# Patient Record
Sex: FEMALE | Race: WHITE
[De-identification: names, ages, dates, MRNs, and addresses within clinical notes are randomized per-mention and may not be internally consistent; named-entity substitution may affect disease eponyms.]

---

## 2017-07-08 ENCOUNTER — HOSPITAL ENCOUNTER (OUTPATIENT)
Dept: HOSPITAL 47 - LABPAT | Age: 47
Discharge: HOME | End: 2017-07-08
Payer: COMMERCIAL

## 2017-07-08 DIAGNOSIS — Z01.812: Primary | ICD-10-CM

## 2017-07-08 LAB
ANION GAP SERPL CALC-SCNC: 13 MMOL/L
BASOPHILS # BLD AUTO: 0 K/UL (ref 0–0.2)
BASOPHILS NFR BLD AUTO: 0 %
BUN SERPL-SCNC: 16 MG/DL (ref 7–17)
CALCIUM SPEC-MCNC: 9 MG/DL (ref 8.4–10.2)
CH: 32.7
CHCM: 34
CHLORIDE SERPL-SCNC: 103 MMOL/L (ref 98–107)
CO2 SERPL-SCNC: 24 MMOL/L (ref 22–30)
EOSINOPHIL # BLD AUTO: 0.3 K/UL (ref 0–0.7)
EOSINOPHIL NFR BLD AUTO: 3 %
ERYTHROCYTE [DISTWIDTH] IN BLOOD BY AUTOMATED COUNT: 4.3 M/UL (ref 3.8–5.4)
ERYTHROCYTE [DISTWIDTH] IN BLOOD: 13.7 % (ref 11.5–15.5)
GLUCOSE SERPL-MCNC: 169 MG/DL (ref 74–99)
HCT VFR BLD AUTO: 41.5 % (ref 34–46)
HDW: 2.24
HGB BLD-MCNC: 14.3 GM/DL (ref 11.4–16)
LUC NFR BLD AUTO: 1 %
LYMPHOCYTES # SPEC AUTO: 2.1 K/UL (ref 1–4.8)
LYMPHOCYTES NFR SPEC AUTO: 26 %
MCH RBC QN AUTO: 33.2 PG (ref 25–35)
MCHC RBC AUTO-ENTMCNC: 34.4 G/DL (ref 31–37)
MCV RBC AUTO: 96.6 FL (ref 80–100)
MONOCYTES # BLD AUTO: 0.3 K/UL (ref 0–1)
MONOCYTES NFR BLD AUTO: 4 %
NEUTROPHILS # BLD AUTO: 5.3 K/UL (ref 1.3–7.7)
NEUTROPHILS NFR BLD AUTO: 66 %
NON-AFRICAN AMERICAN GFR(MDRD): >60
POTASSIUM SERPL-SCNC: 4.1 MMOL/L (ref 3.5–5.1)
SODIUM SERPL-SCNC: 140 MMOL/L (ref 137–145)
WBC # BLD AUTO: 0.12 10*3/UL
WBC # BLD AUTO: 8 K/UL (ref 3.8–10.6)
WBC (PEROX): 8

## 2017-07-08 PROCEDURE — 85025 COMPLETE CBC W/AUTO DIFF WBC: CPT

## 2017-07-08 PROCEDURE — 80048 BASIC METABOLIC PNL TOTAL CA: CPT

## 2017-07-17 ENCOUNTER — HOSPITAL ENCOUNTER (OUTPATIENT)
Dept: HOSPITAL 47 - OR | Age: 47
LOS: 1 days | Discharge: HOME | End: 2017-07-18
Payer: COMMERCIAL

## 2017-07-17 VITALS — BODY MASS INDEX: 47.5 KG/M2

## 2017-07-17 DIAGNOSIS — M54.2: ICD-10-CM

## 2017-07-17 DIAGNOSIS — N85.00: ICD-10-CM

## 2017-07-17 DIAGNOSIS — Z79.899: ICD-10-CM

## 2017-07-17 DIAGNOSIS — J45.909: ICD-10-CM

## 2017-07-17 DIAGNOSIS — N80.0: Primary | ICD-10-CM

## 2017-07-17 DIAGNOSIS — N92.0: ICD-10-CM

## 2017-07-17 DIAGNOSIS — N85.8: ICD-10-CM

## 2017-07-17 DIAGNOSIS — D25.9: ICD-10-CM

## 2017-07-17 DIAGNOSIS — G43.909: ICD-10-CM

## 2017-07-17 DIAGNOSIS — Z79.51: ICD-10-CM

## 2017-07-17 DIAGNOSIS — Z87.891: ICD-10-CM

## 2017-07-17 DIAGNOSIS — F90.9: ICD-10-CM

## 2017-07-17 DIAGNOSIS — K21.9: ICD-10-CM

## 2017-07-17 DIAGNOSIS — G47.33: ICD-10-CM

## 2017-07-17 DIAGNOSIS — N83.8: ICD-10-CM

## 2017-07-17 PROCEDURE — 86901 BLOOD TYPING SEROLOGIC RH(D): CPT

## 2017-07-17 PROCEDURE — 94760 N-INVAS EAR/PLS OXIMETRY 1: CPT

## 2017-07-17 PROCEDURE — 94640 AIRWAY INHALATION TREATMENT: CPT

## 2017-07-17 PROCEDURE — 81025 URINE PREGNANCY TEST: CPT

## 2017-07-17 PROCEDURE — 86850 RBC ANTIBODY SCREEN: CPT

## 2017-07-17 PROCEDURE — 58573 TLH W/T/O UTERUS OVER 250 G: CPT

## 2017-07-17 PROCEDURE — 88307 TISSUE EXAM BY PATHOLOGIST: CPT

## 2017-07-17 PROCEDURE — 85025 COMPLETE CBC W/AUTO DIFF WBC: CPT

## 2017-07-17 PROCEDURE — 86900 BLOOD TYPING SEROLOGIC ABO: CPT

## 2017-07-17 RX ADMIN — HYDROMORPHONE HYDROCHLORIDE PRN MG: 1 INJECTION, SOLUTION INTRAMUSCULAR; INTRAVENOUS; SUBCUTANEOUS at 13:36

## 2017-07-17 RX ADMIN — BUDESONIDE AND FORMOTEROL FUMARATE DIHYDRATE SCH PUFF: 160; 4.5 AEROSOL RESPIRATORY (INHALATION) at 21:10

## 2017-07-17 RX ADMIN — HEPARIN SODIUM SCH UNIT: 5000 INJECTION, SOLUTION INTRAVENOUS; SUBCUTANEOUS at 16:31

## 2017-07-17 RX ADMIN — HYDROCODONE BITARTRATE AND ACETAMINOPHEN PRN EACH: 10; 325 TABLET ORAL at 17:02

## 2017-07-17 RX ADMIN — DOCUSATE SODIUM AND SENNOSIDES SCH EACH: 50; 8.6 TABLET ORAL at 21:14

## 2017-07-17 RX ADMIN — IBUPROFEN PRN MG: 800 TABLET, FILM COATED ORAL at 19:39

## 2017-07-17 RX ADMIN — HYDROCODONE BITARTRATE AND ACETAMINOPHEN PRN EACH: 10; 325 TABLET ORAL at 23:07

## 2017-07-17 RX ADMIN — HYDROMORPHONE HYDROCHLORIDE PRN MG: 1 INJECTION, SOLUTION INTRAMUSCULAR; INTRAVENOUS; SUBCUTANEOUS at 13:14

## 2017-07-17 RX ADMIN — POTASSIUM CHLORIDE SCH MLS: 14.9 INJECTION, SOLUTION INTRAVENOUS at 10:05

## 2017-07-17 RX ADMIN — POTASSIUM CHLORIDE SCH MLS: 14.9 INJECTION, SOLUTION INTRAVENOUS at 08:42

## 2017-07-17 RX ADMIN — POTASSIUM CHLORIDE SCH MLS: 14.9 INJECTION, SOLUTION INTRAVENOUS at 08:33

## 2017-07-17 RX ADMIN — HYDROMORPHONE HYDROCHLORIDE PRN MG: 1 INJECTION, SOLUTION INTRAMUSCULAR; INTRAVENOUS; SUBCUTANEOUS at 13:25

## 2017-07-17 RX ADMIN — POTASSIUM CHLORIDE SCH MLS: 14.9 INJECTION, SOLUTION INTRAVENOUS at 10:04

## 2017-07-17 NOTE — P.HPOB
History of Present Illness


H&P Date: 07/17/17


Chief Complaint: Menorrhagia and fibroid uterus





Sridevi is a 46 she'll female with heavy vaginal bleeding.  She did have an 

ablation procedure which stopped heavy bleeding however after just 2 months 

bleeding returned and was heavy again.  She also has a known 7 cm fibroid.  

Risks/benefits/alternatives to a robotic-assisted laparoscopic hysterectomy 

possible BSO were discussed the patient in detail and all questions are 

answered for her prior to proceeding to the operating room.  Risks did include 

damage to bladder, bowel, vascular injuries, bleeding, infection, ureteral 

injuries.  On physical exam this a well-developed well-nourished female whose 

HEENT is otherwise unremarkable.  Heart regular, lungs clear, extremities 

without pain.  Pelvic exam does feel slightly enlarged uterus.  Abdomen is 

otherwise soft, positive bowel sounds are noted.  Assessment menorrhagia and 

fibroid uterus.  Plan robotic-assisted left scopic hysterectomy possible BENNY/

BSO.





Past Medical History


Past Medical History: Asthma, GERD/Reflux, Sleep Apnea/CPAP/BIPAP


Additional Past Medical History / Comment(s): STATES FIBROID TUMORS, HX OF 

CELIAC DX


History of Any Multi-Drug Resistant Organisms: None Reported


Past Surgical History: Appendectomy, Back Surgery


Additional Past Surgical History / Comment(s): novasure uterine ablation, 

spinal surgery t6-7 fused, breast reduction, breast biopsies


Past Anesthesia/Blood Transfusion Reactions: Previous Problems w/ Anesthesia


Additional Past Anesthesia/Blood Transfusion Reaction / Comment(s): "SEVERE 

SORE THROAT WITH INTUBATION" STATES NEEDS SMALL INTUBATION TUBE.  ALSO HAS 

DIFFICULTY FLEXING NECK R/T FUSION


Past Psychological History: ADD/ADHD


Smoking Status: Former smoker





- Past Family History


  ** Father


Family Medical History: Cancer, Diabetes Mellitus


Additional Family Medical History / Comment(s): small cell lung ca





  ** Mother


Additional Family Medical History / Comment(s): ALS





Medications and Allergies


 Home Medications











 Medication  Instructions  Recorded  Confirmed  Type


 


Ibuprofen [Motrin] 800 mg PO TID PRN 12/24/15 07/10/17 History


 


Omeprazole [PriLOSEC] 20 mg PO DAILY 12/24/15 07/10/17 History


 


Albuterol Inhaler [Ventolin Hfa 1 - 2 puff INHALATION Q6HR PRN 12/30/15 07/10/

17 History





Inhaler]    


 


Mometasone/Formoterol [Dulera 200 2 puff INHALATION BID 12/30/15 07/10/17 

History





Mcg/5 Mcg Inhaler]    


 


Cyclobenzaprine [Flexeril] 10 mg PO DAILY PRN 07/10/17 07/10/17 History


 


Dextroamphetamine/Amphetamine 20 mg PO BID PRN 07/10/17 07/10/17 History





[Adderall]    


 


HYDROcodone/APAP 10-325MG [Norco 1 tab PO Q6H PRN 07/10/17 07/10/17 History





]    


 


Omeprazole 20 mg PO DAILY 07/10/17 07/10/17 History


 


traZODone HCL 50 mg PO HS 07/10/17 07/10/17 History











 Allergies











Allergy/AdvReac Type Severity Reaction Status Date / Time


 


adhesive Allergy  Rash/Hives Verified 07/10/17 10:17


 


gluten Allergy  Abdominal Verified 07/10/17 10:30





   Pain  


 


meperidine HCl [From Demerol] Allergy  Nausea & Verified 07/10/17 10:17





   Vomiting  














Exam


Osteopathic Statement: *.  No significant issues noted on an osteopathic 

structural exam other than those noted in the History and Physical/Consult.

## 2017-07-17 NOTE — P.OP
Date of Procedure: 07/17/17


Preoperative Diagnosis: 


Vaginal bleeding with fibroid uterus


Postoperative Diagnosis: 


Same


Procedure(s) Performed: 


Da Shandra assisted laparoscopic hysterectomy with bilateral salpingo-oophorectomy


Implants: 





Anesthesia: GURMEETA


Surgeon: Geraldo Mcmahon


Assistant #1: Simi Ramsey


Estimated Blood Loss (ml): 20


IV fluids (ml): 900


Urine output (ml): 250


Pathology: other (Uterus, cervix, fallopian tubes, ovaries)


Condition: stable


Disposition: floor


Indications for Procedure: 





Operative Findings: 


Grossly enlarged fibroid uterus with suspected adenomyosis


Description of Procedure: 


Patient was taken to the operating suite where a general anesthetic was found 

be adequate.  She was prepped and draped in the normal sterile fashion and 

placed in the dorsal lithotomy position.  Initially a speculum was inserted 

into the vagina into lip cervix identified and a single-tooth tenaculum was 

placed.  2 sutures were placed one at 3 and the other 9 hands clock position.  

Cervix was then dilated and sounded to or teens centimeter.  Using a 3 cm cup 

and a 12 cm extension the Annabel manipulator was inserted without difficulty 

other instruments removed and Pickett catheter was placed.  Gloves were then 

changed and attention was turned to the abdominal portion of the procedure.  2 

mL of quarter percent Marcaine were then injected 3 cm above the umbilicus and 

through this injected anesthetic a 8 mm skin incision was made.  Through this 

incision under direct visualization with an optical trocar and sleeve the 

camera was inserted.  Once peritoneal placement was assured gas was allowed to 

fully insufflate the abdomen and patient was placed in a very steep 

Trendelenburg position.  2 lateral ports then placed on the right and left-hand 

side well centimeters from the umbilicus.  A fourth port and sleeve were 

inserted through a 1 cm incision between the left lateral and the medial port.  

At this junction robot was brought in and using a scissor and the one arm and a 

known grasper in the 2 arm I did break scrub and go to the console.  

Observations the pelvis were noted initially the infundibulopelvic layer was 

identified and left grasped cauterized and cut broad ligament tissues 

underneath the ovary were also cauterized and cut to the round ligament.  Round 

ligament was then identified cauterized and cut this transection was then 

developed through the posterior and anterior leafs the broad ligament to 

skeletonize the vasculature.  Vasculature was then cauterized.  Undermining the 

bladder flap was then done using [place] grasped undermining tissue and the 

scissor to incise across face uterus and the bladder was bluntly dissected out 

of the operative field.  Same process was repeated on the right-hand side.  

Uterus is very large and somewhat boggy making this process somewhat slow and 

tedious.  Once right and left sides were fully developed and vessels cauterized 

we did do an anterior colpotomy.  Cup was identified and then moving in a 

counterclockwise motion we did follow the couple around cheating head when 

necessary to maintain excellent hemostasis.  Once 3 and 60 was done and a 

Valentine bag was brought into the abdomen through the vagina and the uterus was 

placed in the Valentine bag.  It was then brought down in standard fashion to the 

vagina and at this point I re-scrub in and did do morselization of the uterus 

to remove it from the abdomen through the vagina.  Excellent care was taken to 

maintain bag integrity and to maintain and assure that no vaginal damage was 

done during this process.  Basically morcellation was done using a C-shaped 

incision voiding out little bit farther and then repeating the process until 

the uterus was out.  Once out a glove filled with the towel was placed to 

maintain pneumoperitoneum.  At this point I did break scrub again and go back 

to the console and the pelvis was irrigated vaginal cuff was then closed with 2-

0 Vicryl on V lock suture there was still some bleeding on the left corner and 

interrupted 0 Vicryl suture was used to maintain or rather obtain excellent 

hemostasis.  Once this was accomplished gas allowed to expel from the abdomen 

with the camera still dobutamine to make sure that there was no further 

bleeding from that left corner.  Pelvis was then again thoroughly irrigated 

this fluid was then suctioned free and instruments including needles were 

removed from the abdomen.  Gas was then explanted expel from the abdomen 5 deep 

breaths were provided during this process.  4-0 Vicryl was then used to close 

incision incisions subcuticularly and the remaining 8 mL of quarter percent 

Marcaine was injected around these incisions.  I did also do a cystoscopy and 

good flow from both ureteral jets was noted.  Sponge, lap, needle counts were 

all correct 2.  Patient was then taken to the recovery room in stable and 

satisfactory condition.  Pickett cath was replaced following cystoscopy.

## 2017-07-18 VITALS
HEART RATE: 62 BPM | SYSTOLIC BLOOD PRESSURE: 106 MMHG | DIASTOLIC BLOOD PRESSURE: 68 MMHG | RESPIRATION RATE: 20 BRPM | TEMPERATURE: 97.7 F

## 2017-07-18 LAB
BASOPHILS # BLD AUTO: 0 K/UL (ref 0–0.2)
BASOPHILS NFR BLD AUTO: 0 %
CH: 32.4
CHCM: 32.9
EOSINOPHIL # BLD AUTO: 0 K/UL (ref 0–0.7)
EOSINOPHIL NFR BLD AUTO: 0 %
ERYTHROCYTE [DISTWIDTH] IN BLOOD BY AUTOMATED COUNT: 3.68 M/UL (ref 3.8–5.4)
ERYTHROCYTE [DISTWIDTH] IN BLOOD: 13.6 % (ref 11.5–15.5)
HCT VFR BLD AUTO: 36.4 % (ref 34–46)
HDW: 2.18
HGB BLD-MCNC: 12 GM/DL (ref 11.4–16)
LUC NFR BLD AUTO: 2 %
LYMPHOCYTES # SPEC AUTO: 2.3 K/UL (ref 1–4.8)
LYMPHOCYTES NFR SPEC AUTO: 15 %
MCH RBC QN AUTO: 32.7 PG (ref 25–35)
MCHC RBC AUTO-ENTMCNC: 33 G/DL (ref 31–37)
MCV RBC AUTO: 98.9 FL (ref 80–100)
MONOCYTES # BLD AUTO: 0.5 K/UL (ref 0–1)
MONOCYTES NFR BLD AUTO: 3 %
NEUTROPHILS # BLD AUTO: 12.2 K/UL (ref 1.3–7.7)
NEUTROPHILS NFR BLD AUTO: 80 %
WBC # BLD AUTO: 0.24 10*3/UL
WBC # BLD AUTO: 15.2 K/UL (ref 3.8–10.6)
WBC (PEROX): 16.08

## 2017-07-18 RX ADMIN — IBUPROFEN PRN MG: 800 TABLET, FILM COATED ORAL at 05:20

## 2017-07-18 RX ADMIN — DOCUSATE SODIUM AND SENNOSIDES SCH EACH: 50; 8.6 TABLET ORAL at 07:53

## 2017-07-18 RX ADMIN — HYDROCODONE BITARTRATE AND ACETAMINOPHEN PRN EACH: 10; 325 TABLET ORAL at 07:17

## 2017-07-18 RX ADMIN — IBUPROFEN PRN MG: 800 TABLET, FILM COATED ORAL at 13:20

## 2017-07-18 RX ADMIN — BUDESONIDE AND FORMOTEROL FUMARATE DIHYDRATE SCH PUFF: 160; 4.5 AEROSOL RESPIRATORY (INHALATION) at 09:23

## 2017-07-18 RX ADMIN — HEPARIN SODIUM SCH UNIT: 5000 INJECTION, SOLUTION INTRAVENOUS; SUBCUTANEOUS at 05:21

## 2017-07-18 NOTE — P.DS
Providers


Expected date of discharge: 07/18/17


Attending physician: 


Geraldo Mcmahon





Primary care physician: 


[facility] Course: 





Sridevi is seen and evaluated postop day 1.  She is involuting, voiding, and 

she is tolerating her diet.  She voices no complaints and has had flatus.  Is 

requesting discharge home today.  Vital signs are stable and afebrile.  Heart 

regular, lungs clear, extremities without pain.  Abdomen soft incisions are are 

clean dry and intact.  Prescription for Norco and Motrin have been provided.  

Discharge instructions thoroughly reviewed and all questions are answered for 

her prior to discharge.  We'll have her follow up with me in 1 week.  

Assessment postop day 1.  Plan discharged home.


Patient Condition at Discharge: Good





Plan - Discharge Summary


New Discharge Prescriptions: 


New


   HYDROcodone/APAP 10-325MG [Norco ] 1 tab PO Q6H PRN #30 tab


     PRN Reason: Pain


   Ibuprofen [Motrin] 600 mg PO Q6HR PRN #30 tab


     PRN Reason: Pain





No Action


   Ibuprofen [Motrin] 800 mg PO TID PRN


     PRN Reason: Migraine Headache


   Albuterol Inhaler [Ventolin Hfa Inhaler] 1 - 2 puff INHALATION Q6HR PRN


     PRN Reason: Shortness Of Breath


   Mometasone/Formoterol [Dulera 200 Mcg/5 Mcg Inhaler] 2 puff INHALATION BID


   traZODone HCL 50 mg PO HS


   HYDROcodone/APAP 10-325MG [Norco ] 1 tab PO Q6H PRN


     PRN Reason: Pain


   Dextroamphetamine/Amphetamine [Adderall] 20 mg PO BID PRN


     PRN Reason: ADHD


   Omeprazole 20 mg PO DAILY


   Cyclobenzaprine [Flexeril] 10 mg PO DAILY PRN


     PRN Reason: muscle spasms


Discharge Medication List





Ibuprofen [Motrin] 800 mg PO TID PRN 12/24/15 [History]


Albuterol Inhaler [Ventolin Hfa Inhaler] 1 - 2 puff INHALATION Q6HR PRN 12/30/

15 [History]


Mometasone/Formoterol [Dulera 200 Mcg/5 Mcg Inhaler] 2 puff INHALATION BID 12/30

/15 [History]


Cyclobenzaprine [Flexeril] 10 mg PO DAILY PRN 07/10/17 [History]


Dextroamphetamine/Amphetamine [Adderall] 20 mg PO BID PRN 07/10/17 [History]


HYDROcodone/APAP 10-325MG [Norco ] 1 tab PO Q6H PRN 07/10/17 [History]


Omeprazole 20 mg PO DAILY 07/10/17 [History]


traZODone HCL 50 mg PO HS 07/10/17 [History]


HYDROcodone/APAP 10-325MG [Norco ] 1 tab PO Q6H PRN #30 tab 07/18/17 [Rx]


Ibuprofen [Motrin] 600 mg PO Q6HR PRN #30 tab 07/18/17 [Rx]








Follow up Appointment(s)/Referral(s): 


Geraldo Mcmahon DO [Doctor of Osteopathic Medicine] - 1 Week


Activity/Diet/Wound Care/Special Instructions: 


FOLLOW UP WITH DR MCMAHON AS ADVISED, SOONER IF PROBLEMS OR CONCERNS..IE  CHILLS

, FEVER, REDNESS OR UNUSUAL FOUL DRAINAGE FROM INCISIONAL SITES. EXCESSIVE 

VAGINAL BLEEDING OR FOUL SMELLING DISCHARGE. SWELLING OR PAIN OF THE LOWER 

EXTREMITIES. NO SHOWERS, SWIMMING POOLS, OR HOT TUBS. DAILY SHOWER AND PAT 

INCISIONS DRY. DO NOT REMOVE STERI STEPS, THEY WILL FALL OFF ON THEIR OWN . NO 

INTERCOURSE, NO TAMPONS FOR VAGINAL BLEEDING. NO DRIVING TIL OK'D BY DR MCMAOHN. NO HEAVY LIFTING, PUSHING OR PULLING, OR STRENUOUS ACTIVITY.


Discharge Disposition: HOME SELF-CARE

## 2017-09-06 ENCOUNTER — HOSPITAL ENCOUNTER (OUTPATIENT)
Dept: HOSPITAL 47 - RADMAMWWP | Age: 47
Discharge: HOME | End: 2017-09-06
Payer: COMMERCIAL

## 2017-09-06 DIAGNOSIS — Z12.31: Primary | ICD-10-CM

## 2017-09-08 NOTE — MM
Reason for exam: screening  (asymptomatic).

Last mammogram was performed 2 years and 10 months ago.



History:

Patient is postmenopausal.

Mammotome Biopsy Removal of both breasts, 2008.  Reductions of both breasts, 

December 2007.  Excisional biopsy of the right breast, 2002.



Physical Findings:

A clinical breast exam by your physician is recommended on an annual basis and 

results should be correlated with mammographic findings.



MG Screening Mammo w CAD

Bilateral CC and MLO view(s) were taken.  XCCL view(s) were taken of the left 

breast.

Prior study comparison: November 12, 2014, bilateral MG screening mammo w CAD.  

November 23, 2012, CAD bilateral diagnostic mammogram.

There are scattered fibroglandular densities.  Stable benign calcifications.There 

is no discrete abnormality.  No significant changes when compared with prior 

studies.





ASSESSMENT: Benign, BI-RAD 2



RECOMMENDATION:

Routine screening mammogram of both breasts in 1 year.

## 2018-02-16 ENCOUNTER — HOSPITAL ENCOUNTER (EMERGENCY)
Dept: HOSPITAL 47 - EC | Age: 48
Discharge: HOME | End: 2018-02-16
Payer: COMMERCIAL

## 2018-02-16 VITALS
RESPIRATION RATE: 18 BRPM | HEART RATE: 56 BPM | SYSTOLIC BLOOD PRESSURE: 104 MMHG | DIASTOLIC BLOOD PRESSURE: 61 MMHG | TEMPERATURE: 97.7 F

## 2018-02-16 DIAGNOSIS — J06.9: ICD-10-CM

## 2018-02-16 DIAGNOSIS — Z91.010: ICD-10-CM

## 2018-02-16 DIAGNOSIS — Z91.011: ICD-10-CM

## 2018-02-16 DIAGNOSIS — Z99.89: ICD-10-CM

## 2018-02-16 DIAGNOSIS — G47.30: ICD-10-CM

## 2018-02-16 DIAGNOSIS — K21.9: ICD-10-CM

## 2018-02-16 DIAGNOSIS — Z79.899: ICD-10-CM

## 2018-02-16 DIAGNOSIS — J45.901: Primary | ICD-10-CM

## 2018-02-16 DIAGNOSIS — Z91.018: ICD-10-CM

## 2018-02-16 DIAGNOSIS — Z88.8: ICD-10-CM

## 2018-02-16 DIAGNOSIS — Z91.048: ICD-10-CM

## 2018-02-16 DIAGNOSIS — Z87.891: ICD-10-CM

## 2018-02-16 DIAGNOSIS — Z79.51: ICD-10-CM

## 2018-02-16 LAB
ALBUMIN SERPL-MCNC: 4.4 G/DL (ref 3.5–5)
ALP SERPL-CCNC: 77 U/L (ref 38–126)
ALT SERPL-CCNC: 76 U/L (ref 9–52)
ANION GAP SERPL CALC-SCNC: 11 MMOL/L
AST SERPL-CCNC: 42 U/L (ref 14–36)
BASOPHILS # BLD AUTO: 0 K/UL (ref 0–0.2)
BASOPHILS NFR BLD AUTO: 0 %
BUN SERPL-SCNC: 16 MG/DL (ref 7–17)
CALCIUM SPEC-MCNC: 9.7 MG/DL (ref 8.4–10.2)
CHLORIDE SERPL-SCNC: 103 MMOL/L (ref 98–107)
CO2 SERPL-SCNC: 27 MMOL/L (ref 22–30)
EOSINOPHIL # BLD AUTO: 0.1 K/UL (ref 0–0.7)
EOSINOPHIL NFR BLD AUTO: 1 %
ERYTHROCYTE [DISTWIDTH] IN BLOOD BY AUTOMATED COUNT: 4.42 M/UL (ref 3.8–5.4)
ERYTHROCYTE [DISTWIDTH] IN BLOOD: 13 % (ref 11.5–15.5)
GLUCOSE SERPL-MCNC: 181 MG/DL (ref 74–99)
HCT VFR BLD AUTO: 41.5 % (ref 34–46)
HGB BLD-MCNC: 13.9 GM/DL (ref 11.4–16)
LYMPHOCYTES # SPEC AUTO: 2.7 K/UL (ref 1–4.8)
LYMPHOCYTES NFR SPEC AUTO: 21 %
MCH RBC QN AUTO: 31.4 PG (ref 25–35)
MCHC RBC AUTO-ENTMCNC: 33.4 G/DL (ref 31–37)
MCV RBC AUTO: 93.9 FL (ref 80–100)
MONOCYTES # BLD AUTO: 0.5 K/UL (ref 0–1)
MONOCYTES NFR BLD AUTO: 4 %
NEUTROPHILS # BLD AUTO: 9.5 K/UL (ref 1.3–7.7)
NEUTROPHILS NFR BLD AUTO: 73 %
PLATELET # BLD AUTO: 194 K/UL (ref 150–450)
POTASSIUM SERPL-SCNC: 3.9 MMOL/L (ref 3.5–5.1)
PROT SERPL-MCNC: 7.5 G/DL (ref 6.3–8.2)
SODIUM SERPL-SCNC: 141 MMOL/L (ref 137–145)
WBC # BLD AUTO: 13 K/UL (ref 3.8–10.6)

## 2018-02-16 PROCEDURE — 71046 X-RAY EXAM CHEST 2 VIEWS: CPT

## 2018-02-16 PROCEDURE — 96361 HYDRATE IV INFUSION ADD-ON: CPT

## 2018-02-16 PROCEDURE — 36415 COLL VENOUS BLD VENIPUNCTURE: CPT

## 2018-02-16 PROCEDURE — 80053 COMPREHEN METABOLIC PANEL: CPT

## 2018-02-16 PROCEDURE — 87502 INFLUENZA DNA AMP PROBE: CPT

## 2018-02-16 PROCEDURE — 85025 COMPLETE CBC W/AUTO DIFF WBC: CPT

## 2018-02-16 PROCEDURE — 99284 EMERGENCY DEPT VISIT MOD MDM: CPT

## 2018-02-16 PROCEDURE — 96374 THER/PROPH/DIAG INJ IV PUSH: CPT

## 2018-02-16 PROCEDURE — 94640 AIRWAY INHALATION TREATMENT: CPT

## 2018-02-16 PROCEDURE — 80074 ACUTE HEPATITIS PANEL: CPT

## 2018-02-16 PROCEDURE — 93005 ELECTROCARDIOGRAM TRACING: CPT

## 2018-02-16 PROCEDURE — 84484 ASSAY OF TROPONIN QUANT: CPT

## 2018-02-16 NOTE — ED
URI HPI





- General


Chief Complaint: Upper Respiratory Infection


Stated Complaint: ivelisse, chest tightness, coughing


Time Seen by Provider: 02/16/18 09:44


Source: patient, RN notes reviewed


Mode of arrival: wheelchair


Limitations: no limitations





- History of Present Illness


Initial Comments: 





This a 47-year-old female presents emergency Department chief complaint of 

cough congestion, shortness of breath.  Patient states symptoms started 

approximately 10 days ago and have not improved.  Patient states she does have 

a history of asthma though she's had no difficulty with her asthma last 

urinated half or so.  Patient states that she started ALLERGY injections which 

have helped.  Patient states that she went to urgent care a few days ago and 

was given amoxicillin and steroids.  She's also given an injection of steroids.

  She states it did not help much.  She has not given an inhaler or had chest x-

ray.  Patient states she feels tightness in her chest states that she short of 

breath.  Patient denies any palpitations, headache, dizziness.  Patient has hot 

and cold flashes though she correlates this with her hysterectomy.  Patient 

denies sore throat but she has complained of some ear pressure and pain.





- Related Data


 Home Medications











 Medication  Instructions  Recorded  Confirmed


 


Mometasone/Formoterol [Dulera 200 2 puff INHALATION RT-BID PRN 12/30/15 02/16/18





Mcg/5 Mcg Inhaler]   


 


Omeprazole 20 mg PO HS 07/10/17 02/16/18


 


Albuterol Sulfate [Proair Hfa] 2 puff INHALATION RT-Q4H PRN 02/16/18 02/16/18


 


Amoxic-Pot Clav 875-125Mg 1 tab PO BID 02/16/18 02/16/18





[Augmentin 875-125]   


 


Fluticasone Propionate 1 spray EA NOSTRIL DAILY 02/16/18 02/16/18


 


methylPREDNISolone Dose Pack See Taper PO AS DIRECTED 02/16/18 02/16/18





[Medrol Dose Pack]   


 


traZODone  mg PO HS 02/16/18 02/16/18











 Allergies











Allergy/AdvReac Type Severity Reaction Status Date / Time


 


adhesive Allergy  Rash/Hives Verified 02/16/18 10:00


 


corn Allergy  Unknown Verified 02/16/18 10:00


 


gluten Allergy  Abdominal Verified 02/16/18 10:00





   Pain  


 


meperidine HCl [From Demerol] Allergy  Nausea & Verified 02/16/18 10:00





   Vomiting  


 


Milk Containing Products Allergy  Unknown Verified 02/16/18 10:00





[Dairy]     


 


peanut Allergy  Unknown Verified 02/16/18 10:00


 


soy Allergy  Unknown Verified 02/16/18 10:00


 


tree nut Allergy  Unknown Verified 02/16/18 10:00


 


wheat Allergy  Unknown Verified 02/16/18 10:00














Review of Systems


ROS Statement: 


Those systems with pertinent positive or pertinent negative responses have been 

documented in the HPI.





ROS Other: All systems not noted in ROS Statement are negative.





Past Medical History


Past Medical History: Asthma, GERD/Reflux, Sleep Apnea/CPAP/BIPAP


Additional Past Medical History / Comment(s): SHORTNESS OF BREATH WITH ACTIVITY,


History of Any Multi-Drug Resistant Organisms: None Reported


Past Surgical History: Hysterectomy


Additional Past Surgical History / Comment(s): novasure uterine ablation, 

spinal surgery t6-7 fused, breast augmentation, breast biopsies


Past Anesthesia/Blood Transfusion Reactions: Previous Problems w/ Anesthesia


Additional Past Anesthesia/Blood Transfusion Reaction / Comment(s): "SEVERE 

SORE THROAT WITH INTUBATION"


Past Psychological History: No Psychological Hx Reported


Smoking Status: Former smoker


Past Alcohol Use History: None Reported


Past Drug Use History: None Reported





- Past Family History


  ** Father


Family Medical History: Cancer, Diabetes Mellitus


Additional Family Medical History / Comment(s): small cell lung ca





  ** Mother


Additional Family Medical History / Comment(s): ALS





General Exam


Limitations: no limitations


General appearance: alert, in no apparent distress


Head exam: Present: atraumatic, normocephalic, normal inspection


Eye exam: Present: normal appearance, PERRL, EOMI.  Absent: scleral icterus, 

conjunctival injection, periorbital swelling


ENT exam: Present: normal exam, normal oropharynx, mucous membranes moist, TM's 

normal bilaterally, normal external ear exam


Neck exam: Present: normal inspection, full ROM.  Absent: tenderness, 

meningismus, lymphadenopathy


Respiratory exam: Present: wheezes.  Absent: normal lung sounds bilaterally, 

respiratory distress, rales, rhonchi, stridor


Cardiovascular Exam: Present: regular rate, normal rhythm, normal heart sounds.

  Absent: systolic murmur, diastolic murmur, rubs, gallop, clicks





Course


 Vital Signs











  02/16/18 02/16/18 02/16/18





  09:38 10:22 10:36


 


Temperature 98.5 F  


 


Pulse Rate 89 89 94


 


Respiratory 20  





Rate   


 


Blood Pressure 135/68  


 


O2 Sat by Pulse 94 L  





Oximetry   














  02/16/18 02/16/18 02/16/18





  11:33 12:01 12:04


 


Temperature   


 


Pulse Rate 64 71 61


 


Respiratory 18  22





Rate   


 


Blood Pressure 113/54  121/56


 


O2 Sat by Pulse 94 L  98





Oximetry   














  02/16/18





  12:06


 


Temperature 


 


Pulse Rate 66


 


Respiratory 





Rate 


 


Blood Pressure 


 


O2 Sat by Pulse 





Oximetry 














Medical Decision Making





- Medical Decision Making





47-year-old female presented for URI symptoms, shortness of breath.  Patient is 

having acute asthma exacerbation.  Patient does feel improved after IV steroids

, multiple breathing treatments.  I did offer admission for patient states that 

she would rather go home at this time she is advised to continue her steroids 

use inhaler as directed and follow primary care physician discussed nebulizer.  

Patient does agree to plant she was updated on lab results, EKG and chest x-ray.





- Lab Data


Result diagrams: 


 02/16/18 11:44





 02/16/18 11:44


 Lab Results











  02/16/18 02/16/18 02/16/18 Range/Units





  10:00 11:44 11:44 


 


WBC   13.0 H   (3.8-10.6)  k/uL


 


RBC   4.42   (3.80-5.40)  m/uL


 


Hgb   13.9   (11.4-16.0)  gm/dL


 


Hct   41.5   (34.0-46.0)  %


 


MCV   93.9   (80.0-100.0)  fL


 


MCH   31.4   (25.0-35.0)  pg


 


MCHC   33.4   (31.0-37.0)  g/dL


 


RDW   13.0   (11.5-15.5)  %


 


Plt Count   194   (150-450)  k/uL


 


Neutrophils %   73   %


 


Lymphocytes %   21   %


 


Monocytes %   4   %


 


Eosinophils %   1   %


 


Basophils %   0   %


 


Neutrophils #   9.5 H   (1.3-7.7)  k/uL


 


Lymphocytes #   2.7   (1.0-4.8)  k/uL


 


Monocytes #   0.5   (0-1.0)  k/uL


 


Eosinophils #   0.1   (0-0.7)  k/uL


 


Basophils #   0.0   (0-0.2)  k/uL


 


Sodium    141  (137-145)  mmol/L


 


Potassium    3.9  (3.5-5.1)  mmol/L


 


Chloride    103  ()  mmol/L


 


Carbon Dioxide    27  (22-30)  mmol/L


 


Anion Gap    11  mmol/L


 


BUN    16  (7-17)  mg/dL


 


Creatinine    0.73  (0.52-1.04)  mg/dL


 


Est GFR (MDRD) Af Amer    >60  (>60 ml/min/1.73 sqM)  


 


Est GFR (MDRD) Non-Af    >60  (>60 ml/min/1.73 sqM)  


 


Glucose    181 H  (74-99)  mg/dL


 


Calcium    9.7  (8.4-10.2)  mg/dL


 


Total Bilirubin    0.6  (0.2-1.3)  mg/dL


 


AST    42 H  (14-36)  U/L


 


ALT    76 H  (9-52)  U/L


 


Alkaline Phosphatase    77  ()  U/L


 


Troponin I     (0.000-0.034)  ng/mL


 


Total Protein    7.5  (6.3-8.2)  g/dL


 


Albumin    4.4  (3.5-5.0)  g/dL


 


Influenza Type A RNA  Not Detected    (Not Detectd)  


 


Influenza Type B (PCR)  Not Detected    (Not Detectd)  














  02/16/18 Range/Units





  11:44 


 


WBC   (3.8-10.6)  k/uL


 


RBC   (3.80-5.40)  m/uL


 


Hgb   (11.4-16.0)  gm/dL


 


Hct   (34.0-46.0)  %


 


MCV   (80.0-100.0)  fL


 


MCH   (25.0-35.0)  pg


 


MCHC   (31.0-37.0)  g/dL


 


RDW   (11.5-15.5)  %


 


Plt Count   (150-450)  k/uL


 


Neutrophils %   %


 


Lymphocytes %   %


 


Monocytes %   %


 


Eosinophils %   %


 


Basophils %   %


 


Neutrophils #   (1.3-7.7)  k/uL


 


Lymphocytes #   (1.0-4.8)  k/uL


 


Monocytes #   (0-1.0)  k/uL


 


Eosinophils #   (0-0.7)  k/uL


 


Basophils #   (0-0.2)  k/uL


 


Sodium   (137-145)  mmol/L


 


Potassium   (3.5-5.1)  mmol/L


 


Chloride   ()  mmol/L


 


Carbon Dioxide   (22-30)  mmol/L


 


Anion Gap   mmol/L


 


BUN   (7-17)  mg/dL


 


Creatinine   (0.52-1.04)  mg/dL


 


Est GFR (MDRD) Af Amer   (>60 ml/min/1.73 sqM)  


 


Est GFR (MDRD) Non-Af   (>60 ml/min/1.73 sqM)  


 


Glucose   (74-99)  mg/dL


 


Calcium   (8.4-10.2)  mg/dL


 


Total Bilirubin   (0.2-1.3)  mg/dL


 


AST   (14-36)  U/L


 


ALT   (9-52)  U/L


 


Alkaline Phosphatase   ()  U/L


 


Troponin I  <0.012  (0.000-0.034)  ng/mL


 


Total Protein   (6.3-8.2)  g/dL


 


Albumin   (3.5-5.0)  g/dL


 


Influenza Type A RNA   (Not Detectd)  


 


Influenza Type B (PCR)   (Not Detectd)  














02/16/18 12:21


EKG performed at 11:20 normal sinus rhythm with rate 64.  128 QRS 90 QT//

449





Disposition


Clinical Impression: 


 Acute asthma exacerbation, Upper respiratory infection





Disposition: HOME SELF-CARE


Condition: Stable


Instructions:  Asthma (ED)


Additional Instructions: 


Please return to the Emergency Department if symptoms worsen or any other 

concerns.


Referrals: 


Patrick Quiles DO [Primary Care Provider] - 1-2 days


Time of Disposition: 13:01

## 2018-02-16 NOTE — XR
EXAMINATION TYPE: XR chest 2V

 

DATE OF EXAM: 2/16/2018

 

COMPARISON: 7/1/2015

 

HISTORY: 47-year-old female with cough

 

TECHNIQUE:  PA and lateral views

 

FINDINGS:  

Heart upper limits of normal in size. Aorta and vasculature within normal. Mild interstitial prominen
ce is unchanged. Some strandy atelectasis at the left base. No consolidation or pleural effusion. ACD
F hardware.

 

 

 

IMPRESSION:  

 

1. Heart upper limits of normal in size.

2. Chronic changes, possible chronic bronchitis/asthma. No acute process seen.

## 2018-06-18 ENCOUNTER — HOSPITAL ENCOUNTER (OUTPATIENT)
Dept: HOSPITAL 47 - RADXRMAIN | Age: 48
Discharge: HOME | End: 2018-06-18
Payer: COMMERCIAL

## 2018-06-18 DIAGNOSIS — Z98.1: ICD-10-CM

## 2018-06-18 DIAGNOSIS — M46.02: ICD-10-CM

## 2018-06-18 DIAGNOSIS — M25.572: ICD-10-CM

## 2018-06-18 DIAGNOSIS — M77.32: Primary | ICD-10-CM

## 2018-06-18 DIAGNOSIS — M54.6: ICD-10-CM

## 2018-06-18 DIAGNOSIS — M54.5: ICD-10-CM

## 2018-06-18 PROCEDURE — 72072 X-RAY EXAM THORAC SPINE 3VWS: CPT

## 2018-06-18 PROCEDURE — 72050 X-RAY EXAM NECK SPINE 4/5VWS: CPT

## 2018-06-18 PROCEDURE — 72110 X-RAY EXAM L-2 SPINE 4/>VWS: CPT

## 2018-06-18 NOTE — XR
EXAMINATION TYPE: XR lumbosacral spine min 4V

 

DATE OF EXAM: 6/18/2018

 

COMPARISON: NONE

 

HISTORY: Chronic back pain

 

TECHNIQUE: 5 views

 

FINDINGS: The vertebra have normal spacing and alignment. Posterior elements are intact. There is no 
compression fracture. There is mild spurring of the endplates. Sacroiliac joints are normal.

 

IMPRESSION: Negative lumbar spine exam.

## 2018-06-18 NOTE — XR
EXAMINATION TYPE: XR ankle complete LT

 

DATE OF EXAM: 6/18/2018

 

COMPARISON: NONE

 

HISTORY: Ankle pain

 

TECHNIQUE: 3 views

 

FINDINGS: There are plantar and Achilles calcaneal spurs. There is mild spurring of the anterior mall
eolus. Ankle mortise is anatomic. Subtalar joint is intact.

 

IMPRESSION: Degenerative spur formation. No fracture.

## 2018-06-18 NOTE — XR
EXAMINATION TYPE: XR foot complete LT

 

DATE OF EXAM: 6/18/2018

 

COMPARISON: NONE

 

HISTORY: Chronic foot pain

 

TECHNIQUE: 3 views

 

FINDINGS: There are plantar and Achilles calcaneal spurs. Metatarsals are intact. I see no fracture n
or dislocation. There are no erosions.

 

IMPRESSION: Calcaneal spurring. No fracture. No evidence of inflammatory arthritis.

## 2018-06-18 NOTE — XR
EXAMINATION TYPE: XR thoracic spine complete

 

DATE OF EXAM: 6/18/2018

 

COMPARISON: NONE

 

HISTORY: Chronic back pain

 

TECHNIQUE: 3 views

 

FINDINGS: Thoracic vertebra have normal spacing and alignment. Posterior elements are intact. There i
s no thoracic paraspinal mass. I see no compression fracture. There is anterior fusion surgery in the
 lower cervical spine.

 

IMPRESSION: Negative thoracic spine exam.

## 2018-06-18 NOTE — XR
EXAMINATION TYPE: XR cervical spine comp

 

DATE OF EXAM: 6/18/2018

 

COMPARISON: February 1, 2012

 

HISTORY: Chronic neck pain

 

TECHNIQUE: 6 views

 

FINDINGS: There is a plate with screws fusing anteriorly the cervical spine at C6-7. There is anterio
r bridging osteophyte at C5-6. Vertebra have normal alignment. Posterior elements are intact. The vasile
ral foramina are fairly well-maintained. Atlantoaxial facet joint is normal.

 

IMPRESSION: Previous fusion surgery. No fracture. There is increased spurring anteriorly at C5-6 comp
ared to old exam.

## 2018-12-19 ENCOUNTER — HOSPITAL ENCOUNTER (OUTPATIENT)
Dept: HOSPITAL 47 - RADMAMWWP | Age: 48
Discharge: HOME | End: 2018-12-19
Attending: FAMILY MEDICINE
Payer: COMMERCIAL

## 2018-12-19 DIAGNOSIS — Z12.31: Primary | ICD-10-CM

## 2018-12-19 PROCEDURE — 77067 SCR MAMMO BI INCL CAD: CPT

## 2018-12-20 NOTE — MM
Reason for exam: screening  (asymptomatic).

Last mammogram was performed 1 year and 3 months ago.



History:

Patient is postmenopausal.

Mammotome Biopsy Removal of both breasts, 2008.  Reductions of both breasts, 

December 2007.  Excisional biopsy of the right breast, 2002.



Physical Findings:

A clinical breast exam by your physician is recommended on an annual basis and 

results should be correlated with mammographic findings.



MG Screening Mammo w CAD

Bilateral CC and MLO view(s) were taken.

Prior study comparison: September 6, 2017, bilateral MG screening mammo w CAD.  

November 12, 2014, bilateral MG screening mammo w CAD.

There are scattered fibroglandular densities.  Stable benign calcifications. There

is no discrete abnormality.  No significant changes when compared with prior 

studies.





ASSESSMENT: Benign, BI-RAD 2



RECOMMENDATION:

Routine screening mammogram of both breasts in 1 year.

## 2019-02-23 ENCOUNTER — HOSPITAL ENCOUNTER (OUTPATIENT)
Dept: HOSPITAL 47 - LABWHC1 | Age: 49
End: 2019-02-23
Attending: FAMILY MEDICINE
Payer: COMMERCIAL

## 2019-02-23 DIAGNOSIS — R63.5: ICD-10-CM

## 2019-02-23 DIAGNOSIS — H81.49: ICD-10-CM

## 2019-02-23 DIAGNOSIS — E11.9: Primary | ICD-10-CM

## 2019-02-23 LAB
ALBUMIN SERPL-MCNC: 4.4 G/DL (ref 3.8–4.9)
ALBUMIN/GLOB SERPL: 1.83 G/DL (ref 1.6–3.17)
ALP SERPL-CCNC: 95 U/L (ref 41–126)
ALT SERPL-CCNC: 40 U/L (ref 8–44)
ANION GAP SERPL CALC-SCNC: 6.6 MMOL/L (ref 4–12)
AST SERPL-CCNC: 35 U/L (ref 13–35)
BUN SERPL-SCNC: 19 MG/DL (ref 9–27)
CALCIUM SPEC-MCNC: 9.3 MG/DL (ref 8.7–10.3)
CHLORIDE SERPL-SCNC: 109 MMOL/L (ref 96–109)
CHOLEST SERPL-MCNC: 206 MG/DL (ref 0–200)
CO2 SERPL-SCNC: 25.4 MMOL/L (ref 21.6–31.8)
ERYTHROCYTE [DISTWIDTH] IN BLOOD BY AUTOMATED COUNT: 4.24 M/UL (ref 3.8–5.4)
ERYTHROCYTE [DISTWIDTH] IN BLOOD: 13.8 % (ref 11.5–15.5)
GLOBULIN SER CALC-MCNC: 2.4 G/DL (ref 1.6–3.3)
GLUCOSE SERPL-MCNC: 111 MG/DL (ref 70–110)
HBA1C MFR BLD: 6.4 % (ref 4–6)
HCT VFR BLD AUTO: 40.6 % (ref 34–46)
HDLC SERPL-MCNC: 42 MG/DL (ref 40–60)
HGB BLD-MCNC: 13.4 GM/DL (ref 11.4–16)
LDLC SERPL CALC-MCNC: 132.4 MG/DL (ref 0–131)
MCH RBC QN AUTO: 31.5 PG (ref 25–35)
MCHC RBC AUTO-ENTMCNC: 33 G/DL (ref 31–37)
MCV RBC AUTO: 95.7 FL (ref 80–100)
PLATELET # BLD AUTO: 199 K/UL (ref 150–450)
POTASSIUM SERPL-SCNC: 4.4 MMOL/L (ref 3.5–5.5)
PROT SERPL-MCNC: 6.8 G/DL (ref 6.2–8.2)
SODIUM SERPL-SCNC: 141 MMOL/L (ref 135–145)
T4 FREE SERPL-MCNC: 0.8 NG/DL (ref 0.8–1.8)
TRIGL SERPL-MCNC: 158 MG/DL (ref 0–149)
VLDLC SERPL CALC-MCNC: 31.6 MG/DL (ref 5–40)
WBC # BLD AUTO: 7.5 K/UL (ref 3.8–10.6)

## 2019-02-23 PROCEDURE — 84443 ASSAY THYROID STIM HORMONE: CPT

## 2019-02-23 PROCEDURE — 80053 COMPREHEN METABOLIC PANEL: CPT

## 2019-02-23 PROCEDURE — 84439 ASSAY OF FREE THYROXINE: CPT

## 2019-02-23 PROCEDURE — 83036 HEMOGLOBIN GLYCOSYLATED A1C: CPT

## 2019-02-23 PROCEDURE — 85027 COMPLETE CBC AUTOMATED: CPT

## 2019-02-23 PROCEDURE — 80061 LIPID PANEL: CPT

## 2019-02-23 PROCEDURE — 36415 COLL VENOUS BLD VENIPUNCTURE: CPT

## 2019-03-01 ENCOUNTER — HOSPITAL ENCOUNTER (OUTPATIENT)
Dept: HOSPITAL 47 - RADCTMAIN | Age: 49
End: 2019-03-01
Attending: FAMILY MEDICINE
Payer: COMMERCIAL

## 2019-03-01 DIAGNOSIS — H53.8: ICD-10-CM

## 2019-03-01 DIAGNOSIS — H54.7: ICD-10-CM

## 2019-03-01 DIAGNOSIS — R42: Primary | ICD-10-CM

## 2019-03-01 PROCEDURE — 70470 CT HEAD/BRAIN W/O & W/DYE: CPT

## 2019-03-01 NOTE — CT
EXAMINATION TYPE: CT brain wo/w con

 

DATE OF EXAM: 3/1/2019

 

COMPARISON: None

 

HISTORY: dizziness, changes in vision

 

CT DLP: 2232 mGycm

Automated exposure control for dose reduction was used.

 

CONTRAST: 

Performed with IV Contrast, patient injected with 100 mL of Isovue 300.

 

FINDINGS: 

Ventricles and sulci appear normal. There is no mass effect nor midline shift. There is no sign of in
tracranial hemorrhage. Calvarium is intact.

 

IMPRESSION: 

NEGATIVE CT SCAN OF THE BRAIN.

## 2019-04-08 ENCOUNTER — HOSPITAL ENCOUNTER (OUTPATIENT)
Dept: HOSPITAL 47 - LABWHC1 | Age: 49
Discharge: HOME | End: 2019-04-08
Attending: PSYCHIATRY & NEUROLOGY
Payer: COMMERCIAL

## 2019-04-08 ENCOUNTER — HOSPITAL ENCOUNTER (OUTPATIENT)
Dept: HOSPITAL 47 - RADMRIMAIN | Age: 49
End: 2019-04-08
Attending: PSYCHIATRY & NEUROLOGY
Payer: COMMERCIAL

## 2019-04-08 DIAGNOSIS — M54.16: Primary | ICD-10-CM

## 2019-04-08 DIAGNOSIS — Z53.9: Primary | ICD-10-CM

## 2019-04-08 PROCEDURE — 82306 VITAMIN D 25 HYDROXY: CPT

## 2019-04-08 PROCEDURE — 36415 COLL VENOUS BLD VENIPUNCTURE: CPT

## 2019-04-08 PROCEDURE — 86140 C-REACTIVE PROTEIN: CPT

## 2019-04-08 PROCEDURE — 85652 RBC SED RATE AUTOMATED: CPT

## 2019-07-06 ENCOUNTER — HOSPITAL ENCOUNTER (EMERGENCY)
Dept: HOSPITAL 47 - EC | Age: 49
LOS: 1 days | Discharge: HOME | End: 2019-07-07
Payer: COMMERCIAL

## 2019-07-06 VITALS
RESPIRATION RATE: 16 BRPM | TEMPERATURE: 98.3 F | HEART RATE: 85 BPM | DIASTOLIC BLOOD PRESSURE: 108 MMHG | SYSTOLIC BLOOD PRESSURE: 178 MMHG

## 2019-07-06 DIAGNOSIS — Z87.891: ICD-10-CM

## 2019-07-06 DIAGNOSIS — E66.01: ICD-10-CM

## 2019-07-06 DIAGNOSIS — Z91.011: ICD-10-CM

## 2019-07-06 DIAGNOSIS — Z79.899: ICD-10-CM

## 2019-07-06 DIAGNOSIS — M54.6: Primary | ICD-10-CM

## 2019-07-06 DIAGNOSIS — Z91.018: ICD-10-CM

## 2019-07-06 DIAGNOSIS — M25.559: ICD-10-CM

## 2019-07-06 DIAGNOSIS — R06.02: ICD-10-CM

## 2019-07-06 DIAGNOSIS — G47.30: ICD-10-CM

## 2019-07-06 DIAGNOSIS — Z91.010: ICD-10-CM

## 2019-07-06 DIAGNOSIS — Z91.09: ICD-10-CM

## 2019-07-06 DIAGNOSIS — Z91.048: ICD-10-CM

## 2019-07-06 DIAGNOSIS — M54.9: ICD-10-CM

## 2019-07-06 DIAGNOSIS — Z88.5: ICD-10-CM

## 2019-07-06 PROCEDURE — 71101 X-RAY EXAM UNILAT RIBS/CHEST: CPT

## 2019-07-06 PROCEDURE — 96372 THER/PROPH/DIAG INJ SC/IM: CPT

## 2019-07-06 PROCEDURE — 99283 EMERGENCY DEPT VISIT LOW MDM: CPT

## 2019-07-06 PROCEDURE — 72100 X-RAY EXAM L-S SPINE 2/3 VWS: CPT

## 2019-07-06 PROCEDURE — 72070 X-RAY EXAM THORAC SPINE 2VWS: CPT

## 2019-07-06 PROCEDURE — 73502 X-RAY EXAM HIP UNI 2-3 VIEWS: CPT

## 2019-07-06 NOTE — XR
EXAM:

  XR Right Ribs and AP Chest, 3 or More Views

 

CLINICAL HISTORY:

Pain, fall

 

TECHNIQUE:

  Frontal and oblique views of the right ribs and frontal view of the 

chest.

 

COMPARISON:

  No relevant prior studies available.

 

FINDINGS:

  Lungs:  Unremarkable.  No consolidation.

  Pleural space:  Unremarkable.  No pneumothorax.

  Heart:  Unremarkable.  No cardiomegaly.

  Mediastinum:  Unremarkable.

  Bones/joints:  Unremarkable.  No acute fracture.

 

IMPRESSION:     

No evidence for displaced rib fracture.  Unremarkable appearance to the 

chest

## 2019-07-06 NOTE — XR
EXAM:

  XR Lumbar Spine, 2 or 3 Views

 

CLINICAL HISTORY:

   fall, pain

 

TECHNIQUE:

  Frontal and lateral views of the lumbar spine.

 

COMPARISON:

  No relevant prior studies available.

 

FINDINGS:

  Vertebrae: There is osteophytosis at T12-L1.  The possibility of mild 

compression deformity of T12 cannot be excluded.  No evidence for 

fracture lumbar spine

  Disc spaces:  No acute findings.  No significant narrowing.

  Soft tissues:  Unremarkable.

 

IMPRESSION:     

  Normal lumbar spine x-rays.  Possibility of a compression deformity of 

T12 inferior endplate cannot be totally excluded

## 2019-07-06 NOTE — ED
Back Pain HPI





- General


Chief Complaint: Back Pain/Injury


Stated Complaint: Back pain/SOB


Time Seen by Provider: 07/06/19 21:20


Source: patient


Limitations: no limitations





- History of Present Illness


Initial Comments: 


Sridevi Quan is a morbidly obese 47 yo female with PMH of degenerative disk 

disease, chronic back pain who presents to the ED today for evaluation of right 

sided back and hip pain. Patient reports that on Friday of last week she was at 

work when she fell backward out of her chair, her chair flipped backward and she

fell to her right landing on her back.  Patient reports she heard a popping when

she fell.  She reports she did okay for a couple days after the fall however she

is developed progressively worsening pain in her back.  Patient reports that th

ey've been vacationing of [place] however they came back a day early due to her 

pain and inability to sleep comfortably at night.  She contacted her work and 

was advised to come to the ER for further evaluation.  Patient's versus 

concerned because she does have a history of chronic back pain and has had MRIs 

in the past that if revealed degenerative disc disease.  Patient reports that 

she's been taking her home Flexeril and Norco with some relief of the pain.








- Related Data


                                Home Medications











 Medication  Instructions  Recorded  Confirmed


 


ALPRAZolam [Xanax] 2 mg PO HS 07/06/19 07/06/19


 


Cyclobenzaprine [Flexeril] 10 mg PO HS 07/06/19 07/06/19


 


Dextroamphetamine/Amphetamine 20 mg PO DAILY 07/06/19 07/06/19





[Adderall]   


 


Ergocalciferol (Vitamin D2) 50,000 unit PO SA 07/06/19 07/06/19





[Vitamin D2]   


 


HYDROcodone/APAP 5-325MG [Norco 0.5 - 1 tab PO DAILY PRN 07/06/19 07/06/19





5-325]   


 


Ibuprofen [Motrin] 800 mg PO BID 07/06/19 07/06/19








                                  Previous Rx's











 Medication  Instructions  Recorded


 


Acetaminophen with Codeine 1 tab PO Q6H PRN 3 Days #12 tab 07/07/19





[Tylenol w/codeine #3]  


 


Lidocaine 5% Patch [Lidoderm] 1 patch TOPICAL DAILY #30 patch 07/07/19











                                    Allergies











Allergy/AdvReac Type Severity Reaction Status Date / Time


 


adhesive Allergy  Rash/Hives Verified 07/06/19 21:21


 


cat dander Allergy  Unknown Verified 07/06/19 21:21


 


corn Allergy  Unknown Verified 07/06/19 21:21


 


dog dander Allergy  Unknown Verified 07/06/19 21:21


 


gluten Allergy  Abdominal Verified 07/06/19 21:21





   Pain  


 


meperidine HCl [From Demerol] Allergy  Nausea & Verified 07/06/19 21:21





   Vomiting  


 


Milk Containing Products Allergy  Unknown Verified 07/06/19 21:21





[Dairy]     


 


mold Allergy  Unknown Verified 07/06/19 21:21


 


peanut Allergy  Unknown Verified 07/06/19 21:21


 


soy Allergy  Unknown Verified 07/06/19 21:21


 


tree nut Allergy  Unknown Verified 07/06/19 21:21


 


wheat Allergy  Unknown Verified 07/06/19 21:21


 


DUST Allergy  Unknown Uncoded 07/06/19 21:21














Review of Systems


ROS Statement: 


Those systems with pertinent positive or pertinent negative responses have been 

documented in the HPI.





ROS Other: All systems not noted in ROS Statement are negative.





Past Medical History


Past Medical History: Asthma, GERD/Reflux, Sleep Apnea/CPAP/BIPAP


Additional Past Medical History / Comment(s): SHORTNESS OF BREATH WITH ACTIVITY,


History of Any Multi-Drug Resistant Organisms: None Reported


Past Surgical History: Hysterectomy


Additional Past Surgical History / Comment(s): novasure uterine ablation, spinal

 surgery t6-7 fused, breast augmentation, breast biopsies


Past Anesthesia/Blood Transfusion Reactions: Previous Problems w/ Anesthesia


Additional Past Anesthesia/Blood Transfusion Reaction / Comment(s): "SEVERE SORE

 THROAT WITH INTUBATION"


Past Psychological History: No Psychological Hx Reported


Smoking Status: Former smoker


Past Alcohol Use History: None Reported


Past Drug Use History: None Reported





- Past Family History


  ** Father


Family Medical History: Cancer, Diabetes Mellitus


Additional Family Medical History / Comment(s): small cell lung ca





  ** Mother


Additional Family Medical History / Comment(s): ALS





General Exam





- General Exam Comments


Initial Comments: 


Physical Exam


GENERAL:


Orbitally obese female, appears uncomfortable


Patient is well-developed and well-nourished.  


Patient is nontoxic and well-hydrated





HENT:


Normocephalic, Atraumatic. 





EYES:


PERRL, EOMI





PULMONARY:


Unlabored respirations.  


No audible rales rhonchi or wheezing was noted.





CARDIOVASCULAR:


There is a regular rate and rhythm without any murmurs gallops or rubs.  





ABDOMEN:


Soft and nontender with normal bowel sounds. 





SKIN:


Skin is clear with no lesions or rashes and otherwise unremarkable.


No obvious contusions or bruising noted





: 


Deferred





NEUROLOGIC:


Patient is alert and oriented x3.  Moving all extremities spontaneously





MUSCULOSKELETAL:


Normal extremities with adequate strength and full range of motion.  No lower 

extremity swelling or edema.  No calf tenderness.  


Tenderness to palpation of the posterior right ribs





PSYCHIATRIC:


Normal psychiatric evaluation





Limitations: no limitations





Course


                                   Vital Signs











  07/06/19





  20:52


 


Temperature 98.3 F


 


Pulse Rate 85


 


Respiratory 16





Rate 


 


Blood Pressure 178/108


 


O2 Sat by Pulse 95





Oximetry 














Medical Decision Making





- Medical Decision Making


Patient was seen and evaluated history is obtained from the patient and  

at bedside


X-ray imaging was ordered to evaluate for any injury.  Patient's fall did occur 

8 days prior.


X-rays reveal possible endplate compression fracture of T12 no other acute 

abnormalities were noted.  These results were discussed with the patient and 

 at bedside.  I am medications were given in the emergency department 

patient will be discharged home with pain medication surety has Flexeril and 

Motrin 800 at home.  Lidoderm patches were ordered first patch was applied here 

in the ER.  Patient will follow up with her primary care physician on Monday for

 reevaluation.








Disposition


Clinical Impression: 


 Mechanical back pain, Thoracic back pain





Disposition: HOME SELF-CARE


Condition: Stable


Instructions (If sedation given, give patient instructions):  Vertebral 

Compression Fracture (ED)


Prescriptions: 


Lidocaine 5% Patch [Lidoderm] 1 patch TOPICAL DAILY #30 patch


Acetaminophen with Codeine [Tylenol w/codeine #3] 1 tab PO Q6H PRN 3 Days #12 

tab


 PRN Reason: Pain


Is patient prescribed a controlled substance at d/c from ED?: No


Referrals: 


Patrick Quiles DO [Primary Care Provider] - 1-2 days

## 2019-07-06 NOTE — XR
EXAM:

  XR Thoracic Spine, 2 Views

 

CLINICAL HISTORY:

  : fall, pain

 

TECHNIQUE:

  Frontal and lateral views of the thoracic spine.

 

COMPARISON:

6/18/18 FINDINGS:

  Vertebrae:  Unremarkable.  No acute fracture.  COMPRESSION deformity of 

the inferior endplate of T12 is unchanged compared to prior study.

Disc spaces:  No acute findings.  No significant narrowing.

  Soft tissues:  Unremarkable.

 

IMPRESSION:     

No acute abnormality the thoracic spine.  No significant change in 

alignment compared to prior study

## 2019-07-06 NOTE — XR
EXAM:

  XR Right Hip With Pelvis When Performed, 1 View

 

CLINICAL HISTORY:

  : fall, pain

 

TECHNIQUE:

  Frontal view of the right hip, with pelvis when performed.

 

COMPARISON:

  No relevant prior studies available.

 

FINDINGS:

  Bones/joints:  Unremarkable.  No acute fracture.  No dislocation.

  Soft tissues:  Unremarkable.

 

IMPRESSION:     

  Normal right hip x-ray.  Normal appearance the pelvis in AP projection

## 2019-12-02 ENCOUNTER — HOSPITAL ENCOUNTER (OUTPATIENT)
Dept: HOSPITAL 47 - RADNMMAIN | Age: 49
Discharge: HOME | End: 2019-12-02
Attending: ORTHOPAEDIC SURGERY
Payer: COMMERCIAL

## 2019-12-02 DIAGNOSIS — M25.552: ICD-10-CM

## 2019-12-02 DIAGNOSIS — R93.7: Primary | ICD-10-CM

## 2019-12-02 DIAGNOSIS — M54.5: ICD-10-CM

## 2019-12-02 DIAGNOSIS — M54.6: ICD-10-CM

## 2019-12-02 DIAGNOSIS — M47.816: ICD-10-CM

## 2019-12-02 DIAGNOSIS — E66.9: ICD-10-CM

## 2019-12-02 DIAGNOSIS — M25.78: ICD-10-CM

## 2019-12-02 DIAGNOSIS — M51.36: ICD-10-CM

## 2019-12-02 DIAGNOSIS — M51.37: ICD-10-CM

## 2019-12-02 DIAGNOSIS — M51.34: ICD-10-CM

## 2019-12-02 DIAGNOSIS — M62.830: ICD-10-CM

## 2019-12-02 DIAGNOSIS — M25.551: ICD-10-CM

## 2019-12-02 PROCEDURE — 78306 BONE IMAGING WHOLE BODY: CPT

## 2019-12-02 NOTE — NM
EXAMINATION TYPE: NM bone scan whole body

 

DATE OF EXAM: 12/2/2019

 

COMPARISON: Outside MRI thoracic and lumbar spine July 15, 2019.

 

HISTORY: Thoracic and lumbar spine pain for 4 years per patient. Low back pain, disc degeneration, sp
ondylosis, and vertebral osteophyte all per order.

 

Delayed whole-body scanning was performed following the injection of 25.3 mCi Tc 99m MDP.  Images acq
uired 3 hours post injection. Whole body imaging in anterior and posterior projection as well as ruby
tional spot imaging of the thorax abdomen and pelvis are acquired.

 

FINDINGS: 

 

There is no suspicious scintigraphic increased radiotracer uptake to the bone to suggest osseous meta
static disease or other suspicious abnormality. No suspicious increased radiotracer uptake identified
 in the thoracic or lumbar spine. Mild uptake at level of both knee joints is felt to reflect product
 of degenerative change.

 

IMPRESSION: As above.

## 2019-12-24 ENCOUNTER — HOSPITAL ENCOUNTER (OUTPATIENT)
Dept: HOSPITAL 47 - RADMAMWWP | Age: 49
Discharge: HOME | End: 2019-12-24
Attending: FAMILY MEDICINE
Payer: COMMERCIAL

## 2019-12-24 DIAGNOSIS — Z12.31: Primary | ICD-10-CM

## 2019-12-24 PROCEDURE — 77067 SCR MAMMO BI INCL CAD: CPT

## 2019-12-27 NOTE — MM
Reason for exam: screening  (asymptomatic).

Last mammogram was performed 1 year ago.



History:

Patient is postmenopausal.

Reductions of both breasts, December 2007.  Excisional biopsy of the right breast,

2002.



Physical Findings:

A clinical breast exam by your physician is recommended on an annual basis and 

results should be correlated with mammographic findings.



MG Screening Mammo w CAD

Bilateral CC and MLO view(s) were taken.

Prior study comparison: December 19, 2018, bilateral MG screening mammo w CAD.  

September 6, 2017, bilateral MG screening mammo w CAD.

There are scattered fibroglandular densities.  Benign bilateral oil cyst 

calcifications. Chronic retained skin staple on the left.  No significant changes 

when compared with prior studies.





ASSESSMENT: Benign, BI-RAD 2



RECOMMENDATION:

Routine screening mammogram of both breasts in 1 year.

Manage on a clinical basis with regard to chronically retained skin staple of the 

left.

## 2021-01-20 ENCOUNTER — HOSPITAL ENCOUNTER (EMERGENCY)
Dept: HOSPITAL 47 - EC | Age: 51
Discharge: HOME | End: 2021-01-20
Payer: COMMERCIAL

## 2021-01-20 VITALS — HEART RATE: 76 BPM | TEMPERATURE: 98.3 F | DIASTOLIC BLOOD PRESSURE: 95 MMHG | SYSTOLIC BLOOD PRESSURE: 157 MMHG

## 2021-01-20 VITALS — RESPIRATION RATE: 18 BRPM

## 2021-01-20 DIAGNOSIS — Z91.010: ICD-10-CM

## 2021-01-20 DIAGNOSIS — Z88.8: ICD-10-CM

## 2021-01-20 DIAGNOSIS — Z91.018: ICD-10-CM

## 2021-01-20 DIAGNOSIS — Z86.16: ICD-10-CM

## 2021-01-20 DIAGNOSIS — G47.30: ICD-10-CM

## 2021-01-20 DIAGNOSIS — Z99.89: ICD-10-CM

## 2021-01-20 DIAGNOSIS — R06.02: Primary | ICD-10-CM

## 2021-01-20 DIAGNOSIS — Z88.5: ICD-10-CM

## 2021-01-20 DIAGNOSIS — Z91.048: ICD-10-CM

## 2021-01-20 DIAGNOSIS — Z79.899: ICD-10-CM

## 2021-01-20 LAB
ALBUMIN SERPL-MCNC: 4 G/DL (ref 3.5–5)
ALP SERPL-CCNC: 85 U/L (ref 38–126)
ALT SERPL-CCNC: 33 U/L (ref 4–34)
ANION GAP SERPL CALC-SCNC: 7 MMOL/L
APTT BLD: 23.1 SEC (ref 22–30)
AST SERPL-CCNC: 30 U/L (ref 14–36)
BASOPHILS # BLD AUTO: 0.1 K/UL (ref 0–0.2)
BASOPHILS NFR BLD AUTO: 2 %
BUN SERPL-SCNC: 20 MG/DL (ref 7–17)
CALCIUM SPEC-MCNC: 8.4 MG/DL (ref 8.4–10.2)
CHLORIDE SERPL-SCNC: 104 MMOL/L (ref 98–107)
CO2 SERPL-SCNC: 27 MMOL/L (ref 22–30)
D DIMER PPP FEU-MCNC: 0.31 MG/L FEU (ref ?–0.6)
EOSINOPHIL # BLD AUTO: 0 K/UL (ref 0–0.7)
EOSINOPHIL NFR BLD AUTO: 0 %
ERYTHROCYTE [DISTWIDTH] IN BLOOD BY AUTOMATED COUNT: 4.5 M/UL (ref 3.8–5.4)
ERYTHROCYTE [DISTWIDTH] IN BLOOD: 13.5 % (ref 11.5–15.5)
GLUCOSE SERPL-MCNC: 132 MG/DL (ref 74–99)
HCT VFR BLD AUTO: 41.3 % (ref 34–46)
HGB BLD-MCNC: 14.2 GM/DL (ref 11.4–16)
INR PPP: 1 (ref ?–1.2)
LDH SPEC-CCNC: 596 U/L (ref 313–618)
LYMPHOCYTES # SPEC AUTO: 1.3 K/UL (ref 1–4.8)
LYMPHOCYTES NFR SPEC AUTO: 21 %
MAGNESIUM SPEC-SCNC: 2 MG/DL (ref 1.6–2.3)
MCH RBC QN AUTO: 31.6 PG (ref 25–35)
MCHC RBC AUTO-ENTMCNC: 34.4 G/DL (ref 31–37)
MCV RBC AUTO: 91.9 FL (ref 80–100)
MONOCYTES # BLD AUTO: 0.5 K/UL (ref 0–1)
MONOCYTES NFR BLD AUTO: 8 %
NEUTROPHILS # BLD AUTO: 4.3 K/UL (ref 1.3–7.7)
NEUTROPHILS NFR BLD AUTO: 67 %
PLATELET # BLD AUTO: 167 K/UL (ref 150–450)
POTASSIUM SERPL-SCNC: 3.7 MMOL/L (ref 3.5–5.1)
PROT SERPL-MCNC: 7 G/DL (ref 6.3–8.2)
PT BLD: 10.4 SEC (ref 9–12)
SODIUM SERPL-SCNC: 138 MMOL/L (ref 137–145)
WBC # BLD AUTO: 6.4 K/UL (ref 3.8–10.6)

## 2021-01-20 PROCEDURE — 96374 THER/PROPH/DIAG INJ IV PUSH: CPT

## 2021-01-20 PROCEDURE — 87040 BLOOD CULTURE FOR BACTERIA: CPT

## 2021-01-20 PROCEDURE — 83605 ASSAY OF LACTIC ACID: CPT

## 2021-01-20 PROCEDURE — 84145 PROCALCITONIN (PCT): CPT

## 2021-01-20 PROCEDURE — 83615 LACTATE (LD) (LDH) ENZYME: CPT

## 2021-01-20 PROCEDURE — 71045 X-RAY EXAM CHEST 1 VIEW: CPT

## 2021-01-20 PROCEDURE — 85379 FIBRIN DEGRADATION QUANT: CPT

## 2021-01-20 PROCEDURE — 85730 THROMBOPLASTIN TIME PARTIAL: CPT

## 2021-01-20 PROCEDURE — 85610 PROTHROMBIN TIME: CPT

## 2021-01-20 PROCEDURE — 93005 ELECTROCARDIOGRAM TRACING: CPT

## 2021-01-20 PROCEDURE — 83735 ASSAY OF MAGNESIUM: CPT

## 2021-01-20 PROCEDURE — 80053 COMPREHEN METABOLIC PANEL: CPT

## 2021-01-20 PROCEDURE — 86140 C-REACTIVE PROTEIN: CPT

## 2021-01-20 PROCEDURE — 85025 COMPLETE CBC W/AUTO DIFF WBC: CPT

## 2021-01-20 PROCEDURE — 36415 COLL VENOUS BLD VENIPUNCTURE: CPT

## 2021-01-20 PROCEDURE — 82728 ASSAY OF FERRITIN: CPT

## 2021-01-20 PROCEDURE — 99285 EMERGENCY DEPT VISIT HI MDM: CPT

## 2021-01-20 NOTE — ED
General Adult HPI





- General


Chief complaint: Shortness of Breath


Stated complaint: +Covid, SOB


Time Seen by Provider: 01/20/21 18:00


Source: patient, RN notes reviewed, old records reviewed


Mode of arrival: ambulatory


Limitations: no limitations





- History of Present Illness


Initial comments: 





This a 50-year-old female presents emergency Department with a recent diagnosis 

of COVID.  Patient states she was diagnosed last Thursday.  Patient states she's

been on antibiotics and steroids.  Patient states she thinks her breathing is 

gotten worse since that time.  Patient denies any chest pain patient denies any 

palpitations.  Patient states she has lost her sense of taste and smell.  

Patient denies any patient denies numbness weakness.  Patient denies 

lightheadedness or dizziness.  Patient denies any abdominal pain patient denies 

nausea vomiting or diarrhea.





- Related Data


                                Home Medications











 Medication  Instructions  Recorded  Confirmed


 


ALPRAZolam [Xanax] 2 mg PO HS 07/06/19 07/06/19


 


Cyclobenzaprine [Flexeril] 10 mg PO HS 07/06/19 07/06/19


 


Dextroamphetamine/Amphetamine 20 mg PO DAILY 07/06/19 07/06/19





[Adderall]   


 


Ergocalciferol (Vitamin D2) 50,000 unit PO SA 07/06/19 07/06/19





[Vitamin D2]   


 


HYDROcodone/APAP 5-325MG [Norco 0.5 - 1 tab PO DAILY PRN 07/06/19 07/06/19





5-325]   


 


Ibuprofen [Motrin] 800 mg PO BID 07/06/19 07/06/19








                                  Previous Rx's











 Medication  Instructions  Recorded


 


Acetaminophen with Codeine 1 tab PO Q6H PRN 3 Days #12 tab 07/07/19





[Tylenol w/codeine #3]  


 


Lidocaine 5% Patch [Lidoderm] 1 patch TOPICAL DAILY #30 patch 07/07/19


 


predniSONE [Deltasone] 40 mg PO DAILY #8 tab 01/20/21











                                    Allergies











Allergy/AdvReac Type Severity Reaction Status Date / Time


 


adhesive Allergy  Rash/Hives Verified 01/20/21 18:02


 


cat dander Allergy  Unknown Verified 01/20/21 18:02


 


corn Allergy  Unknown Verified 01/20/21 18:02


 


dog dander Allergy  Unknown Verified 01/20/21 18:02


 


gluten Allergy  Abdominal Verified 01/20/21 18:02





   Pain  


 


meperidine HCl [From Demerol] Allergy  Nausea & Verified 01/20/21 18:02





   Vomiting  


 


Milk Containing Products Allergy  Unknown Verified 01/20/21 18:02





[Dairy]     


 


mold Allergy  Unknown Verified 01/20/21 18:02


 


peanut Allergy  Unknown Verified 01/20/21 18:02


 


soy Allergy  Unknown Verified 01/20/21 18:02


 


tree nut Allergy  Unknown Verified 01/20/21 18:02


 


wheat Allergy  Unknown Verified 01/20/21 18:02


 


DUST Allergy  Unknown Uncoded 01/20/21 18:02














Review of Systems


ROS Statement: 


Those systems with pertinent positive or pertinent negative responses have been 

documented in the HPI.





ROS Other: All systems not noted in ROS Statement are negative.





Past Medical History


Past Medical History: Asthma, GERD/Reflux, Sleep Apnea/CPAP/BIPAP


Additional Past Medical History / Comment(s): SHORTNESS OF BREATH WITH ACTIVITY,


History of Any Multi-Drug Resistant Organisms: None Reported


Past Surgical History: Hysterectomy


Additional Past Surgical History / Comment(s): novasure uterine ablation, spinal

 surgery t6-7 fused, breast augmentation, breast biopsies


Past Anesthesia/Blood Transfusion Reactions: Previous Problems w/ Anesthesia


Additional Past Anesthesia/Blood Transfusion Reaction / Comment(s): "SEVERE SORE

 THROAT WITH INTUBATION"


Past Psychological History: No Psychological Hx Reported


Past Alcohol Use History: None Reported


Past Drug Use History: None Reported





- Past Family History


  ** Father


Family Medical History: Cancer, Diabetes Mellitus


Additional Family Medical History / Comment(s): small cell lung ca





  ** Mother


Additional Family Medical History / Comment(s): ALS





General Exam





- General Exam Comments


Initial Comments: 





GENERAL:


Patient is well-developed and well-nourished.  Patient is nontoxic and well-

hydrated and is in mild distress.





ENT:


Neck is soft and supple.  No significant lymphadenopathy is noted.  Oropharynx 

is clear.  Moist mucous membranes.  Neck has full range of motion without 

eliciting any pain. 





EYES:


The sclera were anicteric and conjunctiva were pink and moist.  Extraocular 

movements were intact and pupils were equal round and reactive to light.  

Eyelids were unremarkable.





PULMONARY:


Unlabored respirations.  Good breath sounds bilaterally.  No audible rales 

rhonchi or wheezing was noted.





CARDIOVASCULAR:


There is a regular rate and rhythm without any murmurs gallops or rubs. 





ABDOMEN:


Soft and nontender with normal bowel sounds.  Patient is morbidly obese





SKIN:


Skin is clear with no lesions or rashes and otherwise unremarkable.





NEUROLOGIC:


Patient is alert and oriented x3.  Cranial nerves II through XII are grossly 

intact.  Motor and sensory are also intact.  Normal speech, volume and content. 

 Symmetrical smile.  





MUSCULOSKELETAL:


Normal extremities with adequate strength and full range of motion.  No lower 

extremity swelling or edema.  No calf tenderness.





LYMPHATICS:


No significant lymphadenopathy is noted





PSYCHIATRIC:


Normal psychiatric evaluation.  


Limitations: no limitations





Course


                                   Vital Signs











  01/20/21





  17:59


 


Temperature 98.8 F


 


Pulse Rate 85


 


Respiratory 18





Rate 


 


Blood Pressure 138/79


 


O2 Sat by Pulse 97





Oximetry 














Medical Decision Making





- Medical Decision Making





EKG shows sinus rhythm at 75 bpm DE interval 108 QRS is 76 QT interval 370 QTC 

is 413.  Patient's EKG shows no ST segment elevation or depression.





Patient was oxygenating between 9798% on room air through her old visits.





- Lab Data


Result diagrams: 


                                 01/20/21 19:09





                                 01/20/21 19:09


                                   Lab Results











  01/20/21 01/20/21 01/20/21 Range/Units





  19:09 19:09 19:09 


 


WBC  6.4    (3.8-10.6)  k/uL


 


RBC  4.50    (3.80-5.40)  m/uL


 


Hgb  14.2    (11.4-16.0)  gm/dL


 


Hct  41.3    (34.0-46.0)  %


 


MCV  91.9    (80.0-100.0)  fL


 


MCH  31.6    (25.0-35.0)  pg


 


MCHC  34.4    (31.0-37.0)  g/dL


 


RDW  13.5    (11.5-15.5)  %


 


Plt Count  167    (150-450)  k/uL


 


MPV  7.3    


 


Neutrophils %  67    %


 


Lymphocytes %  21    %


 


Monocytes %  8    %


 


Eosinophils %  0    %


 


Basophils %  2    %


 


Neutrophils #  4.3    (1.3-7.7)  k/uL


 


Lymphocytes #  1.3    (1.0-4.8)  k/uL


 


Monocytes #  0.5    (0-1.0)  k/uL


 


Eosinophils #  0.0    (0-0.7)  k/uL


 


Basophils #  0.1    (0-0.2)  k/uL


 


PT   10.4   (9.0-12.0)  sec


 


INR   1.0   (<1.2)  


 


APTT   23.1   (22.0-30.0)  sec


 


D-Dimer   0.31   (<0.60)  mg/L FEU


 


Sodium    138  (137-145)  mmol/L


 


Potassium    3.7  (3.5-5.1)  mmol/L


 


Chloride    104  ()  mmol/L


 


Carbon Dioxide    27  (22-30)  mmol/L


 


Anion Gap    7  mmol/L


 


BUN    20 H  (7-17)  mg/dL


 


Creatinine    0.79  (0.52-1.04)  mg/dL


 


Est GFR (CKD-EPI)AfAm    >90  (>60 ml/min/1.73 sqM)  


 


Est GFR (CKD-EPI)NonAf    88  (>60 ml/min/1.73 sqM)  


 


Glucose    132 H  (74-99)  mg/dL


 


Plasma Lactic Acid Venkata     (0.7-2.0)  mmol/L


 


Calcium    8.4  (8.4-10.2)  mg/dL


 


Magnesium    2.0  (1.6-2.3)  mg/dL


 


Total Bilirubin    0.9  (0.2-1.3)  mg/dL


 


AST    30  (14-36)  U/L


 


ALT    33  (4-34)  U/L


 


Alkaline Phosphatase    85  ()  U/L


 


Lactate Dehydrogenase    596  (313-618)  U/L


 


C-Reactive Protein    <5.0  (<10.0)  mg/L


 


Total Protein    7.0  (6.3-8.2)  g/dL


 


Albumin    4.0  (3.5-5.0)  g/dL














  01/20/21 Range/Units





  19:09 


 


WBC   (3.8-10.6)  k/uL


 


RBC   (3.80-5.40)  m/uL


 


Hgb   (11.4-16.0)  gm/dL


 


Hct   (34.0-46.0)  %


 


MCV   (80.0-100.0)  fL


 


MCH   (25.0-35.0)  pg


 


MCHC   (31.0-37.0)  g/dL


 


RDW   (11.5-15.5)  %


 


Plt Count   (150-450)  k/uL


 


MPV   


 


Neutrophils %   %


 


Lymphocytes %   %


 


Monocytes %   %


 


Eosinophils %   %


 


Basophils %   %


 


Neutrophils #   (1.3-7.7)  k/uL


 


Lymphocytes #   (1.0-4.8)  k/uL


 


Monocytes #   (0-1.0)  k/uL


 


Eosinophils #   (0-0.7)  k/uL


 


Basophils #   (0-0.2)  k/uL


 


PT   (9.0-12.0)  sec


 


INR   (<1.2)  


 


APTT   (22.0-30.0)  sec


 


D-Dimer   (<0.60)  mg/L FEU


 


Sodium   (137-145)  mmol/L


 


Potassium   (3.5-5.1)  mmol/L


 


Chloride   ()  mmol/L


 


Carbon Dioxide   (22-30)  mmol/L


 


Anion Gap   mmol/L


 


BUN   (7-17)  mg/dL


 


Creatinine   (0.52-1.04)  mg/dL


 


Est GFR (CKD-EPI)AfAm   (>60 ml/min/1.73 sqM)  


 


Est GFR (CKD-EPI)NonAf   (>60 ml/min/1.73 sqM)  


 


Glucose   (74-99)  mg/dL


 


Plasma Lactic Acid Venkata  1.8  (0.7-2.0)  mmol/L


 


Calcium   (8.4-10.2)  mg/dL


 


Magnesium   (1.6-2.3)  mg/dL


 


Total Bilirubin   (0.2-1.3)  mg/dL


 


AST   (14-36)  U/L


 


ALT   (4-34)  U/L


 


Alkaline Phosphatase   ()  U/L


 


Lactate Dehydrogenase   (313-618)  U/L


 


C-Reactive Protein   (<10.0)  mg/L


 


Total Protein   (6.3-8.2)  g/dL


 


Albumin   (3.5-5.0)  g/dL














Disposition


Clinical Impression: 


 History of COVID-19





Disposition: HOME SELF-CARE


Prescriptions: 


predniSONE [Deltasone] 40 mg PO DAILY #8 tab


Is patient prescribed a controlled substance at d/c from ED?: No


Referrals: 


Patrick Quiles DO [Primary Care Provider] - 1-2 days


Time of Disposition: 20:51

## 2021-01-20 NOTE — XR
EXAMINATION TYPE: XR chest 1V portable

 

DATE OF EXAM: 1/20/2021

 

COMPARISON: 7/6/2019

 

HISTORY: Fall. Pain.

 

TECHNIQUE: Single view

 

FINDINGS: There is no heart failure nor confluent pneumonic infiltrate. Costophrenic angles are clear
. There are no hilar masses. Bony thorax appears intact.

 

IMPRESSION: No active cardiopulmonary disease. There is clearing of minimal atelectasis left lung bas
e compared to old exam.

## 2021-01-21 LAB — FERRITIN SERPL-MCNC: 153.1 NG/ML (ref 10–291)

## 2021-01-25 ENCOUNTER — HOSPITAL ENCOUNTER (INPATIENT)
Dept: HOSPITAL 47 - EC | Age: 51
LOS: 3 days | Discharge: HOME | DRG: 177 | End: 2021-01-28
Attending: INTERNAL MEDICINE | Admitting: INTERNAL MEDICINE
Payer: COMMERCIAL

## 2021-01-25 DIAGNOSIS — Z79.51: ICD-10-CM

## 2021-01-25 DIAGNOSIS — Z91.010: ICD-10-CM

## 2021-01-25 DIAGNOSIS — E11.65: ICD-10-CM

## 2021-01-25 DIAGNOSIS — Z79.899: ICD-10-CM

## 2021-01-25 DIAGNOSIS — Z80.1: ICD-10-CM

## 2021-01-25 DIAGNOSIS — J12.82: ICD-10-CM

## 2021-01-25 DIAGNOSIS — Z90.710: ICD-10-CM

## 2021-01-25 DIAGNOSIS — G47.33: ICD-10-CM

## 2021-01-25 DIAGNOSIS — U07.1: Primary | ICD-10-CM

## 2021-01-25 DIAGNOSIS — Z88.8: ICD-10-CM

## 2021-01-25 DIAGNOSIS — Z98.890: ICD-10-CM

## 2021-01-25 DIAGNOSIS — K21.9: ICD-10-CM

## 2021-01-25 DIAGNOSIS — Z91.048: ICD-10-CM

## 2021-01-25 DIAGNOSIS — Z91.018: ICD-10-CM

## 2021-01-25 DIAGNOSIS — Z91.011: ICD-10-CM

## 2021-01-25 DIAGNOSIS — Z87.891: ICD-10-CM

## 2021-01-25 DIAGNOSIS — Z98.1: ICD-10-CM

## 2021-01-25 DIAGNOSIS — J96.01: ICD-10-CM

## 2021-01-25 DIAGNOSIS — Z88.5: ICD-10-CM

## 2021-01-25 DIAGNOSIS — E87.6: ICD-10-CM

## 2021-01-25 DIAGNOSIS — G89.29: ICD-10-CM

## 2021-01-25 DIAGNOSIS — Z83.3: ICD-10-CM

## 2021-01-25 DIAGNOSIS — E66.01: ICD-10-CM

## 2021-01-25 DIAGNOSIS — J45.20: ICD-10-CM

## 2021-01-25 DIAGNOSIS — E55.9: ICD-10-CM

## 2021-01-25 DIAGNOSIS — T38.0X5A: ICD-10-CM

## 2021-01-25 LAB
ALBUMIN SERPL-MCNC: 3.5 G/DL (ref 3.5–5)
ALP SERPL-CCNC: 80 U/L (ref 38–126)
ALT SERPL-CCNC: 32 U/L (ref 4–34)
ANION GAP SERPL CALC-SCNC: 8 MMOL/L
AST SERPL-CCNC: 24 U/L (ref 14–36)
BASOPHILS # BLD AUTO: 0.2 K/UL (ref 0–0.2)
BASOPHILS NFR BLD AUTO: 2 %
BUN SERPL-SCNC: 17 MG/DL (ref 7–17)
CALCIUM SPEC-MCNC: 8.9 MG/DL (ref 8.4–10.2)
CHLORIDE SERPL-SCNC: 104 MMOL/L (ref 98–107)
CO2 SERPL-SCNC: 29 MMOL/L (ref 22–30)
EOSINOPHIL # BLD AUTO: 0.1 K/UL (ref 0–0.7)
EOSINOPHIL NFR BLD AUTO: 0 %
ERYTHROCYTE [DISTWIDTH] IN BLOOD BY AUTOMATED COUNT: 4.45 M/UL (ref 3.8–5.4)
ERYTHROCYTE [DISTWIDTH] IN BLOOD: 13.2 % (ref 11.5–15.5)
GLUCOSE BLD-MCNC: 167 MG/DL (ref 75–99)
GLUCOSE SERPL-MCNC: 96 MG/DL (ref 74–99)
HCT VFR BLD AUTO: 40.4 % (ref 34–46)
HGB BLD-MCNC: 13.5 GM/DL (ref 11.4–16)
LYMPHOCYTES # SPEC AUTO: 3.1 K/UL (ref 1–4.8)
LYMPHOCYTES NFR SPEC AUTO: 27 %
MCH RBC QN AUTO: 30.4 PG (ref 25–35)
MCHC RBC AUTO-ENTMCNC: 33.4 G/DL (ref 31–37)
MCV RBC AUTO: 90.8 FL (ref 80–100)
MONOCYTES # BLD AUTO: 0.7 K/UL (ref 0–1)
MONOCYTES NFR BLD AUTO: 6 %
NEUTROPHILS # BLD AUTO: 7 K/UL (ref 1.3–7.7)
NEUTROPHILS NFR BLD AUTO: 62 %
PLATELET # BLD AUTO: 190 K/UL (ref 150–450)
POTASSIUM SERPL-SCNC: 3.4 MMOL/L (ref 3.5–5.1)
PROT SERPL-MCNC: 6.5 G/DL (ref 6.3–8.2)
SODIUM SERPL-SCNC: 141 MMOL/L (ref 137–145)
WBC # BLD AUTO: 11.2 K/UL (ref 3.8–10.6)

## 2021-01-25 PROCEDURE — 96374 THER/PROPH/DIAG INJ IV PUSH: CPT

## 2021-01-25 PROCEDURE — 93005 ELECTROCARDIOGRAM TRACING: CPT

## 2021-01-25 PROCEDURE — 71045 X-RAY EXAM CHEST 1 VIEW: CPT

## 2021-01-25 PROCEDURE — 83605 ASSAY OF LACTIC ACID: CPT

## 2021-01-25 PROCEDURE — 87040 BLOOD CULTURE FOR BACTERIA: CPT

## 2021-01-25 PROCEDURE — 99285 EMERGENCY DEPT VISIT HI MDM: CPT

## 2021-01-25 PROCEDURE — 96361 HYDRATE IV INFUSION ADD-ON: CPT

## 2021-01-25 PROCEDURE — 85379 FIBRIN DEGRADATION QUANT: CPT

## 2021-01-25 PROCEDURE — 84145 PROCALCITONIN (PCT): CPT

## 2021-01-25 PROCEDURE — 94640 AIRWAY INHALATION TREATMENT: CPT

## 2021-01-25 PROCEDURE — 85025 COMPLETE CBC W/AUTO DIFF WBC: CPT

## 2021-01-25 PROCEDURE — 86140 C-REACTIVE PROTEIN: CPT

## 2021-01-25 PROCEDURE — 82565 ASSAY OF CREATININE: CPT

## 2021-01-25 PROCEDURE — 96375 TX/PRO/DX INJ NEW DRUG ADDON: CPT

## 2021-01-25 PROCEDURE — 36415 COLL VENOUS BLD VENIPUNCTURE: CPT

## 2021-01-25 PROCEDURE — 80053 COMPREHEN METABOLIC PANEL: CPT

## 2021-01-25 RX ADMIN — BUDESONIDE AND FORMOTEROL FUMARATE DIHYDRATE SCH PUFF: 160; 4.5 AEROSOL RESPIRATORY (INHALATION) at 22:11

## 2021-01-25 RX ADMIN — ALBUTEROL SULFATE PRN PUFF: 90 AEROSOL, METERED RESPIRATORY (INHALATION) at 22:11

## 2021-01-25 RX ADMIN — CEFAZOLIN SCH MLS/HR: 330 INJECTION, POWDER, FOR SOLUTION INTRAMUSCULAR; INTRAVENOUS at 18:50

## 2021-01-25 RX ADMIN — COLCHICINE SCH: 0.6 TABLET, FILM COATED ORAL at 23:11

## 2021-01-25 NOTE — P.HPIM
History of Present Illness


H&P Date: 01/25/21


Chief Complaint: Positive blood culture, code 19 pneumonitis, severe hypoxia and

shortness o





50-year-old female one of Dr. Quiles patient with history of mildly 

overweight, history of asthma, GERD, obstructive sleep apnea, who apparently 

through exposure to family member developed to have code 19 was diagnosed in 

01/14/2021 at [facility] walk-in clinic.  Patient was quarantined ~21 developed

to have significant dyspnea and shortness of breath with significant hypoxia 

ended up coming to the emergency department chest x-ray and blood culture were 

done her culture came back positive for gram-positive cocci.  Patient was called

to come to the emergency room or go see her primary care physician was seen and 

evaluated [facility] department found to have significant hypoxia and white blood 

cell and climb up to 11,000 electrolyte still normal lactic acid was normal C-re

active protein is 7.5 and ferritin level was low from before.  Chest x-ray shows

no acute process of the time patient was having more bronchitis and pneumonia 

chest x-ray from the  care back with minimal atelectasis of the left base.  

Patient was giving 3, off Unasyn which should be continued for now also was 

giving 2 g of vancomycin 1 time and to follow with infectious disease after 

doing another blood culture today.


Patient still hypoxic her  was admitted last week for Covid 19 pneumo

nitis with hypoxia and was in for 5 days discharged home on Sunday.  Patient was

hospitalized from [facility] department will consult infectious disease and 

pulmonary.  Continue Decadron and multivitamin for the Covid 19 complication but

no need for monoclonal anti-body or antiviral medication.





Review of Systems





CONSTITUTIONAL: Well-developed no acute respiratory distress.


EYES: No icterus sclerae, no conjunctivitis.


EARS, NOSE, MOUTH, THROAT, and FACE: No sore throat, lymphadenopathy, carotid 

bruits or deformity.


RESPIRATORY: Mild shortness of breath cough wheezes..


CARDIOVASCULAR: No CP, Palpitation, PND, Orthopnea, or angina.


GASTROINTESTINAL: No Abd pain, Nausea or vomiting, no Diarrhea or constipation, 

No GI Bleed, no distention or masses.


GENITOURINARY: Negative for Hematuria or UTI, no kidney stones.


INTEGUMENT/BREAST: Negative for any muscular injury with mild osteoarthritis..


HEMATOLOGIC/LYMPHATIC: Negative for bleed or purpura.


MUSCULOSKELTAL: Positive Myalgia and arthralgia.


NEURLOGICAL: No LOC, Sz or syncope, blurred vision dizziness or abnormality..


BEHAVIORAL/PSYCH: Negative.


ENDOCRINE: Negative.





Past Medical History


Past Medical History: Asthma, GERD/Reflux, Sleep Apnea/CPAP/BIPAP


Additional Past Medical History / Comment(s): SHORTNESS OF BREATH WITH ACTIVITY,


History of Any Multi-Drug Resistant Organisms: None Reported


Past Surgical History: Hysterectomy


Additional Past Surgical History / Comment(s): novasure uterine ablation, spinal

surgery t6-7 fused, breast augmentation, breast biopsies


Past Anesthesia/Blood Transfusion Reactions: Previous Problems w/ Anesthesia


Additional Past Anesthesia/Blood Transfusion Reaction / Comment(s): "SEVERE SORE

THROAT WITH INTUBATION"


Past Psychological History: No Psychological Hx Reported


Smoking Status: Never smoker


Past Alcohol Use History: None Reported


Additional Past Alcohol Use History / Comment(s): STARTED SMOKING AT AGE 17, 

QUIT IN 2009 SMOKED 1/2PPD


Past Drug Use History: None Reported





- Past Family History


  ** Father


Family Medical History: Cancer, Diabetes Mellitus


Additional Family Medical History / Comment(s): small cell lung ca





  ** Mother


Additional Family Medical History / Comment(s): ALS





Medications and Allergies


                                Home Medications











 Medication  Instructions  Recorded  Confirmed  Type


 


Cyclobenzaprine [Flexeril] 10 mg PO TID PRN 07/06/19 01/25/21 History


 


Dextroamphetamine/Amphetamine 20 mg PO BID 07/06/19 01/25/21 History





[Adderall]    


 


Ergocalciferol (Vitamin D2) 50,000 unit PO WE 07/06/19 01/25/21 History





[Vitamin D2]    


 


Ibuprofen [Motrin] 800 mg PO TID PRN 07/06/19 01/25/21 History


 


predniSONE [Deltasone] 40 mg PO DAILY #8 tab 01/20/21 01/25/21 Rx


 


Albuterol Sulfate [Ventolin HFA] 1 - 2 puff INHALATION RT-QID PRN 01/25/21 01/25/21 History


 


Ascorbic Acid [Vitamin C] 1,000 mg PO DAILY 01/25/21 01/25/21 History


 


Benzonatate [Benzonatate Perle] 200 mg PO TID PRN 01/25/21 01/25/21 History


 


Loratadine [Claritin] 10 mg PO DAILY PRN 01/25/21 01/25/21 History


 


Mometasone/Formoterol [Dulera 200 2 puff PO RT-BID 01/25/21 01/25/21 History





Mcg-5 Mcg Inhaler]    


 


Zinc 50 mg PO DAILY 01/25/21 01/25/21 History


 


Zolpidem [Ambien] 10 mg PO HS PRN 01/25/21 01/25/21 History


 


traMADol HCL/ACETAMINOPHEN 1 tab PO TID PRN 01/25/21 01/25/21 History





[Ultracet 37.5-325]    








                                    Allergies











Allergy/AdvReac Type Severity Reaction Status Date / Time


 


adhesive Allergy  Rash/Hives Verified 01/25/21 15:58


 


cat dander Allergy  Unknown Verified 01/25/21 15:58


 


corn Allergy  Unknown Verified 01/25/21 15:58


 


dog dander Allergy  Unknown Verified 01/25/21 15:58


 


gluten Allergy  Abdominal Verified 01/25/21 15:58





   Pain  


 


meperidine HCl [From Demerol] Allergy  Nausea & Verified 01/25/21 15:58





   Vomiting  


 


Milk Containing Products Allergy  Unknown Verified 01/25/21 15:58





[Dairy]     


 


mold Allergy  Unknown Verified 01/25/21 15:58


 


peanut Allergy  Unknown Verified 01/25/21 15:58


 


soy Allergy  Unknown Verified 01/25/21 15:58


 


tree nut Allergy  Unknown Verified 01/25/21 15:58


 


wheat Allergy  Unknown Verified 01/25/21 15:58


 


DUST Allergy  Unknown Uncoded 01/25/21 14:08














Physical Exam


Vitals: 


                                   Vital Signs











  Temp Pulse Resp BP Pulse Ox


 


 01/25/21 18:59  98.5 F  61  20  135/58  93 L


 


 01/25/21 15:42    20  


 


 01/25/21 15:34   61  20  135/58  93 L


 


 01/25/21 14:05  98.5 F  68  20  119/61  96








                                Intake and Output











 01/25/21 01/25/21 01/25/21





 06:59 14:59 22:59


 


Other:   


 


  Weight  127.006 kg 127.006 kg








General Appearance: Alert, cooperative, no distress, appears stated age.  Mildly

 obese


Neck HEENT: Supple, no lymphadenopathy, no thyroid enlargement, no carotid 

bruits.


Lungs: Decreased breath sound bilaterally with fine crackles and mild wheezes no

 rhonchi, no deformity.


Chest Wall: Chest wall normal expansion with deep inspiration no tenderness and 

no deformity was found on exam, no costochondral pain or discomfort.


Heart: Regular rate and rhythm, S1, S2 normal, no murmur, rub or gallop.


Back: Symmetric, no curvature, ROM normal, no CVA tenderness.


Abdomen: Soft, non-tender, bowel sounds active all four quadrants, no masses, no

 organomegaly.


Extremities: Extremities normal, atraumatic, no cyanosis or edema.


Pulses: 2+ and symmetric.


Skin: Skin color, texture, tugor normal, no rashes or lesions.


Neurologic: Alert oriented x3 cranial nerves II through XII intact, no motor 

deficit, no abnormal balance or gait.





Results


CBC & Chem 7: 


                                 01/25/21 15:15





                                 01/25/21 15:15


Labs: 


                  Abnormal Lab Results - Last 24 Hours (Table)











  01/25/21 01/25/21 Range/Units





  15:15 15:15 


 


WBC  11.2 H   (3.8-10.6)  k/uL


 


Potassium   3.4 L  (3.5-5.1)  mmol/L














Thrombosis Risk Factor Assmnt





- DVT/VTE Prophylaxis


DVT/VTE Prophylaxis: Pharmacologic Prophylaxis ordered, Mechanical Prophylaxis 

ordered





- Choose All That Apply


Any of the Below Risk Factors Present?: Yes


Each Factor Represents 1 point: Medical pt on bed rest


Thrombosis Risk Factor Assessment Total Risk Factor Score: 1


Thrombosis Risk Factor Assessment Level: Low Risk





Assessment and Plan


Assessment: 





1 severe dyspnea and shortness of breath: Still mild complication of Covid 19 p

neumonitis along with bacteremia not a clear source at this point.





2 positive blood culture for gram-positive cocci patient was giving Unasyn and 

vancomycin we'll consult infectious diseases repeat culture watch for any 

finding consistent with cellulitis, watch chest x-ray, watch for any new heart 

murmur and for any increase in white blood cell.





3 history of asthma: Still on rescue inhaler continue updraft treatment for now.





4 history of ADD: Remain on Adderall 20 mg daily.





5 chronic pain management: Continue patient on hydrocodone as needed basis.





6 vitamin D deficiency: Continue vitamin D supplement.





7 electrolyte imbalance: Continue patient on potassium supplement to correct 

hypokalemia.





8 type 2 diabetes: Diet control so far will resume Accu-Chek with sliding scales

 coverage.





9 GI prophylaxis: Patient be on pantoprazole.





10 DVT prophylaxis: Continue patient on heparin subcutaneous.





CODE STATUS: Full code.





Admit patient to the inpatient service for more than 2 night stay.

## 2021-01-25 NOTE — XR
EXAMINATION TYPE: XR chest 1V portable

 

DATE OF EXAM: 1/25/2021

 

COMPARISON: Chest x-ray 1/20/2021

 

HISTORY: Covid positive

 

TECHNIQUE: Single frontal view of the chest is obtained.

 

FINDINGS: Patient is rotated. There is no focal air space opacity, pleural effusion, or pneumothorax 
seen.  The cardiac silhouette size is stable, heart size may be accentuated by technique.   The osseo
us structures are intact.

 

IMPRESSION:  No acute process. Technique is somewhat limited, consider follow-up PA and lateral chest
 x-ray as indicated

## 2021-01-25 NOTE — ED
General Adult HPI





- General


Chief complaint: Recheck/Abnormal Lab/Rx


Stated complaint: Blood infection


Time Seen by Provider: 01/25/21 14:38


Source: patient, RN notes reviewed


Mode of arrival: ambulatory


Limitations: no limitations





- History of Present Illness


Initial comments: 





50-year-old female presents to the emergency room for "positive blood cultures."

 Patient was diagnosed with Covid on January 14 at [facility].  She was seen 

here in the emergency room for shortness of breath which has been persistent 

since the 19th.  She was seen on the 20th and had blood cultures drawn.  These 

came back as gram-positive cocci.  Patient was then called back to the emergency

room today.  Patient states she has not had any obvious fevers, did have some ch

ills last night.  Otherwise she feels the same as she did on the 20th.  Slight 

shortness of breath with mild cough and congestion.Patient has no other 

complaints at this time including chest pain, abdominal pain, nausea or 

vomiting, headache, or visual changes.





- Related Data


                                Home Medications











 Medication  Instructions  Recorded  Confirmed


 


Cyclobenzaprine [Flexeril] 10 mg PO TID PRN 07/06/19 01/25/21


 


Dextroamphetamine/Amphetamine 20 mg PO BID 07/06/19 01/25/21





[Adderall]   


 


Ergocalciferol (Vitamin D2) 50,000 unit PO WE 07/06/19 01/25/21





[Vitamin D2]   


 


Ibuprofen [Motrin] 800 mg PO TID PRN 07/06/19 01/25/21


 


Albuterol Sulfate [Ventolin HFA] 1 - 2 puff INHALATION RT-QID PRN 01/25/21 01/25/21


 


Ascorbic Acid [Vitamin C] 1,000 mg PO DAILY 01/25/21 01/25/21


 


Benzonatate [Benzonatate Perle] 200 mg PO TID PRN 01/25/21 01/25/21


 


Loratadine [Claritin] 10 mg PO DAILY PRN 01/25/21 01/25/21


 


Mometasone/Formoterol [Dulera 200 2 puff PO RT-BID 01/25/21 01/25/21





Mcg-5 Mcg Inhaler]   


 


Zinc 50 mg PO DAILY 01/25/21 01/25/21


 


Zolpidem [Ambien] 10 mg PO HS PRN 01/25/21 01/25/21


 


traMADol HCL/ACETAMINOPHEN 1 tab PO TID PRN 01/25/21 01/25/21





[Ultracet 37.5-325]   








                                  Previous Rx's











 Medication  Instructions  Recorded


 


predniSONE [Deltasone] 40 mg PO DAILY #8 tab 01/20/21











                                    Allergies











Allergy/AdvReac Type Severity Reaction Status Date / Time


 


adhesive Allergy  Rash/Hives Verified 01/25/21 15:58


 


cat dander Allergy  Unknown Verified 01/25/21 15:58


 


corn Allergy  Unknown Verified 01/25/21 15:58


 


dog dander Allergy  Unknown Verified 01/25/21 15:58


 


gluten Allergy  Abdominal Verified 01/25/21 15:58





   Pain  


 


meperidine HCl [From Demerol] Allergy  Nausea & Verified 01/25/21 15:58





   Vomiting  


 


Milk Containing Products Allergy  Unknown Verified 01/25/21 15:58





[Dairy]     


 


mold Allergy  Unknown Verified 01/25/21 15:58


 


peanut Allergy  Unknown Verified 01/25/21 15:58


 


soy Allergy  Unknown Verified 01/25/21 15:58


 


tree nut Allergy  Unknown Verified 01/25/21 15:58


 


wheat Allergy  Unknown Verified 01/25/21 15:58


 


DUST Allergy  Unknown Uncoded 01/25/21 14:08














Review of Systems


ROS Statement: 


Those systems with pertinent positive or pertinent negative responses have been 

documented in the HPI.





ROS Other: All systems not noted in ROS Statement are negative.





Past Medical History


Past Medical History: Asthma, GERD/Reflux, Sleep Apnea/CPAP/BIPAP


Additional Past Medical History / Comment(s): SHORTNESS OF BREATH WITH ACTIVITY,


History of Any Multi-Drug Resistant Organisms: None Reported


Past Surgical History: Hysterectomy


Additional Past Surgical History / Comment(s): novasure uterine ablation, spinal

 surgery t6-7 fused, breast augmentation, breast biopsies


Past Anesthesia/Blood Transfusion Reactions: Previous Problems w/ Anesthesia


Additional Past Anesthesia/Blood Transfusion Reaction / Comment(s): "SEVERE SORE

 THROAT WITH INTUBATION"


Past Psychological History: No Psychological Hx Reported


Smoking Status: Never smoker


Past Alcohol Use History: None Reported


Past Drug Use History: None Reported





- Past Family History


  ** Father


Family Medical History: Cancer, Diabetes Mellitus


Additional Family Medical History / Comment(s): small cell lung ca





  ** Mother


Additional Family Medical History / Comment(s): ALS





General Exam


Limitations: no limitations


General appearance: alert


Head exam: Present: atraumatic, normal inspection


Eye exam: Present: normal appearance, PERRL, EOMI.  Absent: scleral icterus


ENT exam: Present: normal exam, mucous membranes moist


Neck exam: Present: normal inspection, full ROM.  Absent: tenderness


Respiratory exam: Present: normal lung sounds bilaterally.  Absent: respiratory 

distress, wheezes


Cardiovascular Exam: Present: regular rate, normal rhythm, normal heart sounds


GI/Abdominal exam: Present: soft, normal bowel sounds.  Absent: distended, 

tenderness, guarding, rebound, rigid


Skin exam: Present: warm, dry, intact, normal color.  Absent: rash





Course


                                   Vital Signs











  01/25/21 01/25/21 01/25/21





  14:05 15:34 15:42


 


Temperature 98.5 F  


 


Pulse Rate 68 61 


 


Respiratory 20 20 20





Rate   


 


Blood Pressure 119/61 135/58 


 


O2 Sat by Pulse 96 93 L 





Oximetry   














Medical Decision Making





- Medical Decision Making





Patient presents with febrile vitals.  Oxygen saturation initially 96%.  She had

 a positive blood culture which is why she came back in.  Gram-positive cocci.  

Patient however did complain of shortness of breath while here.  Workup was 

started.  CBC does show mild leukocytosis of 11.2.  CMP is unremarkable.  Lactic

 acid normal.  Chest x-ray was obtained which showed no acute process.  She was 

given inhaler which did not seem to help.  Upon further monitoring patient was 

saturating anywhere from 88-92% on room air.  Given persistent hypoxia as well 

as positive blood culture patient will be admitted for IV antibiotics and 

further monitoring.  Dr. Avery requests a d-dimer be added.  Dr. Avery spoke 

with Dr. mello who does accept this admission.





- Lab Data


Result diagrams: 


                                 01/25/21 15:15





                                 01/25/21 15:15


                                   Lab Results











  01/25/21 01/25/21 01/25/21 Range/Units





  15:15 15:15 15:21 


 


WBC  11.2 H    (3.8-10.6)  k/uL


 


RBC  4.45    (3.80-5.40)  m/uL


 


Hgb  13.5    (11.4-16.0)  gm/dL


 


Hct  40.4    (34.0-46.0)  %


 


MCV  90.8    (80.0-100.0)  fL


 


MCH  30.4    (25.0-35.0)  pg


 


MCHC  33.4    (31.0-37.0)  g/dL


 


RDW  13.2    (11.5-15.5)  %


 


Plt Count  190    (150-450)  k/uL


 


MPV  7.2    


 


Neutrophils %  62    %


 


Lymphocytes %  27    %


 


Monocytes %  6    %


 


Eosinophils %  0    %


 


Basophils %  2    %


 


Neutrophils #  7.0    (1.3-7.7)  k/uL


 


Lymphocytes #  3.1    (1.0-4.8)  k/uL


 


Monocytes #  0.7    (0-1.0)  k/uL


 


Eosinophils #  0.1    (0-0.7)  k/uL


 


Basophils #  0.2    (0-0.2)  k/uL


 


Sodium   141   (137-145)  mmol/L


 


Potassium   3.4 L   (3.5-5.1)  mmol/L


 


Chloride   104   ()  mmol/L


 


Carbon Dioxide   29   (22-30)  mmol/L


 


Anion Gap   8   mmol/L


 


BUN   17   (7-17)  mg/dL


 


Creatinine   0.78   (0.52-1.04)  mg/dL


 


Est GFR (CKD-EPI)AfAm   >90   (>60 ml/min/1.73 sqM)  


 


Est GFR (CKD-EPI)NonAf   89   (>60 ml/min/1.73 sqM)  


 


Glucose   96   (74-99)  mg/dL


 


Plasma Lactic Acid Venkata    1.0  (0.7-2.0)  mmol/L


 


Calcium   8.9   (8.4-10.2)  mg/dL


 


Total Bilirubin   0.9   (0.2-1.3)  mg/dL


 


AST   24   (14-36)  U/L


 


ALT   32   (4-34)  U/L


 


Alkaline Phosphatase   80   ()  U/L


 


C-Reactive Protein   7.5   (<10.0)  mg/L


 


Total Protein   6.5   (6.3-8.2)  g/dL


 


Albumin   3.5   (3.5-5.0)  g/dL














Disposition


Clinical Impression: 


 History of COVID-19, Positive blood culture, Hypoxia





Disposition: ADMITTED AS IP TO THIS HOSP


Is patient prescribed a controlled substance at d/c from ED?: No


Referrals: 


Patrick Quiles DO [Primary Care Provider] - 1-2 days


Time of Disposition: 18:20

## 2021-01-26 LAB
GLUCOSE BLD-MCNC: 209 MG/DL (ref 75–99)
GLUCOSE BLD-MCNC: 239 MG/DL (ref 75–99)
GLUCOSE BLD-MCNC: 280 MG/DL (ref 75–99)
GLUCOSE BLD-MCNC: 281 MG/DL (ref 75–99)
GLUCOSE BLD-MCNC: 307 MG/DL (ref 75–99)

## 2021-01-26 RX ADMIN — ZOLPIDEM TARTRATE PRN MG: 10 TABLET, FILM COATED ORAL at 22:16

## 2021-01-26 RX ADMIN — ZOLPIDEM TARTRATE PRN MG: 10 TABLET, FILM COATED ORAL at 01:07

## 2021-01-26 RX ADMIN — BUDESONIDE AND FORMOTEROL FUMARATE DIHYDRATE SCH PUFF: 160; 4.5 AEROSOL RESPIRATORY (INHALATION) at 07:23

## 2021-01-26 RX ADMIN — INSULIN DETEMIR SCH UNIT: 100 INJECTION, SOLUTION SUBCUTANEOUS at 09:31

## 2021-01-26 RX ADMIN — CEFAZOLIN SCH MLS/HR: 330 INJECTION, POWDER, FOR SOLUTION INTRAMUSCULAR; INTRAVENOUS at 17:21

## 2021-01-26 RX ADMIN — AMPICILLIN SODIUM AND SULBACTAM SODIUM SCH MLS/HR: 2; 1 INJECTION, POWDER, FOR SOLUTION INTRAMUSCULAR; INTRAVENOUS at 12:10

## 2021-01-26 RX ADMIN — SODIUM CHLORIDE SCH MLS/HR: 9 INJECTION, SOLUTION INTRAVENOUS at 17:20

## 2021-01-26 RX ADMIN — INSULIN ASPART SCH UNIT: 100 INJECTION, SOLUTION INTRAVENOUS; SUBCUTANEOUS at 20:15

## 2021-01-26 RX ADMIN — SODIUM CHLORIDE SCH MLS/HR: 9 INJECTION, SOLUTION INTRAVENOUS at 05:52

## 2021-01-26 RX ADMIN — COLCHICINE SCH MG: 0.6 TABLET, FILM COATED ORAL at 07:45

## 2021-01-26 RX ADMIN — ALBUTEROL SULFATE PRN PUFF: 90 AEROSOL, METERED RESPIRATORY (INHALATION) at 07:23

## 2021-01-26 RX ADMIN — OXYCODONE HYDROCHLORIDE AND ACETAMINOPHEN SCH MG: 500 TABLET ORAL at 07:44

## 2021-01-26 RX ADMIN — AMPICILLIN SODIUM AND SULBACTAM SODIUM SCH MLS/HR: 2; 1 INJECTION, POWDER, FOR SOLUTION INTRAMUSCULAR; INTRAVENOUS at 19:19

## 2021-01-26 RX ADMIN — INSULIN ASPART SCH UNIT: 100 INJECTION, SOLUTION INTRAVENOUS; SUBCUTANEOUS at 17:20

## 2021-01-26 RX ADMIN — ALBUTEROL SULFATE PRN PUFF: 90 AEROSOL, METERED RESPIRATORY (INHALATION) at 19:57

## 2021-01-26 RX ADMIN — BUDESONIDE AND FORMOTEROL FUMARATE DIHYDRATE SCH PUFF: 160; 4.5 AEROSOL RESPIRATORY (INHALATION) at 19:56

## 2021-01-26 RX ADMIN — ENOXAPARIN SODIUM SCH MG: 40 INJECTION SUBCUTANEOUS at 07:44

## 2021-01-26 RX ADMIN — Medication SCH MG: at 07:44

## 2021-01-26 RX ADMIN — COLCHICINE SCH MG: 0.6 TABLET, FILM COATED ORAL at 20:15

## 2021-01-26 RX ADMIN — PANTOPRAZOLE SODIUM SCH MG: 40 TABLET, DELAYED RELEASE ORAL at 07:44

## 2021-01-26 RX ADMIN — AMPICILLIN SODIUM AND SULBACTAM SODIUM SCH MLS/HR: 2; 1 INJECTION, POWDER, FOR SOLUTION INTRAMUSCULAR; INTRAVENOUS at 07:43

## 2021-01-26 RX ADMIN — INSULIN ASPART SCH UNIT: 100 INJECTION, SOLUTION INTRAVENOUS; SUBCUTANEOUS at 12:10

## 2021-01-26 RX ADMIN — AMPICILLIN SODIUM AND SULBACTAM SODIUM SCH MLS/HR: 2; 1 INJECTION, POWDER, FOR SOLUTION INTRAMUSCULAR; INTRAVENOUS at 00:31

## 2021-01-26 NOTE — CONS
CONSULTATION



DATE OF SERVICE:

01/26/2021



REASON FOR CONSULTATION:

Gram-positive bacteremia.



HISTORY OF PRESENT ILLNESS:

The patient is a 50-year-old  female who apparently started getting sick on

around January 13. Symptoms have been mostly URI, for which the patient was seen the

next day at [facility] on January 14, 2021, when the patient was diagnosed with COVID-

19. The patient has been treated with steroids and antibiotics on an outpatient basis.

The patient mentioned that she did not have significant improvement, and she started

having more lower respiratory tract symptoms of increasing shortness of breath and she

did have a cough but no sputum production, for which the patient was seen at [facility] on January 20, 2021. The patient did have a chest x-ray that was negative

for any acute infiltrate.  The patient was saturating 97% to 98% on room air.  The

patient did have blood cultures drawn and the patient was discharged home to continue

with antibiotics and steroids.  Blood culture was drawn on January 20 and after 5 days

came back reported positive by the micro lab, for which the patient was called and was

advised to come back to the hospital.  The patient mentioned some shortness of breath

on exertion but denies having any chest pain.  The patient denies having any cough.

Denies having any URI symptoms.  Denies having nausea. No vomiting, no abdominal pain,

no diarrhea.  The patient currently does not have any skin sores or any redness and no

joint swelling.  The patient has been afebrile since presentation to the hospital. She

is currently saturating 93% to 96% on room air. The patient did have a white count of

11.2 but no lymphopenia.  D-dimer was 0.36, procalcitonin 0.04.  CRP normal at 7.5.

Liver enzymes are normal.  The patient is currently on Unasyn and vancomycin.  She did

have a chest x-ray reported negative for any acute process.  Infectious Disease was

consulted for further recommendations and a positive blood culture.



REVIEW OF SYSTEMS:

Positive points have been mentioned in HPI.  Rest of the systems are negative.



PAST MEDICAL HISTORY:

Asthma, gastroesophageal reflux disease, sleep apnea, and _____ COVID-19.



PAST SURGICAL HISTORY:

Hysterectomy, breast biopsy and breast augmentation.



SOCIAL HISTORY:

No history of smoking, drinking or drug use.



FAMILY HISTORY:

Father with history of small-cell lung cancer and diabetes mellitus.  Mother with ALS.



ALLERGIES:

MEPERIDINE and MOLD.



MEDICATIONS:

The patient is currently on Ventolin, Unasyn, vitamin C, Tessalon Perles, Symbicort,

colchicine, Flexeril, dexamethasone, Lovenox, NovoLog, Levemir, Narcan, Protonix,

vancomycin, Pharmacy to dose, zinc and Ambien.



PHYSICAL EXAMINATION:

Blood pressure 123/71, pulse of 60, temperature 98.1. She is 92% on room air.

General description is a middle-aged female lying in bed in no distress.

HEENT:  Examination shows no pallor or scleral icterus.  Oral mucous membrane is dry.

NECK: Trachea is central. No thyromegaly.

LUNGS: Unlabored breathing. Decreased intensity of breath sounds. No wheeze or crackle.

HEART: S1, S2.  Regular rate and rhythm.

ABDOMEN: Soft. No tenderness. No guarding or rigidity.  No organomegaly.

EXTREMITIES: No edema of the feet.

SKIN EXAMINATION: No rash or mass palpable.

Neurologically the patient is awake, alert, oriented x3. Mood and affect normal.



LABS:

Hemoglobin 13.4, white count 11.2, BUN of 17, creatinine 0.78.  Liver enzymes are

normal.  _____ is normal.  Procalcitonin normal. _____ report negative.



DIAGNOSTIC IMPRESSION AND PLAN:

Patient with a positive blood culture with Gram-positive cocci that has been reported

positive after 5 days in this patient with no fever and no obvious focus of infection,

more likely representing a skin contamination.



PLAN:

1. We will wait for the ID on the first blood culture as well as blood culture that

    was drawn yesterday to be completely finalized.

2. May continue with vancomycin while watching her kidney function closely.

3. Will follow clinical condition and culture to further adjust medication if needed.

    Thank you for this consultation. Will follow this patient along with you.





MMODL / IJN: [id number] / Job#: [id number]

## 2021-01-26 NOTE — P.CNPUL
History of Present Illness


Consult date: 01/26/21


Requesting physician: Giovani Ortiz


Reason for consult: dyspnea, cough, other


Chief complaint: Positive blood cultures, cough, wheezing, recent COVID 19 

infection


History of present illness: 


 50-year-old white female patient with past medical history of chronic bronchial

asthma, unspecified, GERD/reflux, sleep apnea on CPAP, non-smoker, obesity, who 

had recently been diagnosed with COVID 19 infection on 01/14/2021 at the [facility] urgent care clinic.  Apparently patient was getting antibiotics and 

steroids on an outpatient basis.  However her breathing had gotten worse and 

patient had a ER visit on 01/20/2021.  Patient was having increased shortness of

breath, loss of taste and smell, but no chest pain, no palpitations, no 

abdominal pain, no nausea vomiting or diarrhea.  Her EKG showed normal sinus 

rhythm with no ST segment elevation or depression, patient was on room air with 

pulse ox of 97-98%, chest x-ray was completed showing no active cardiopulmonary 

disease and clearing of minimal atelectasis at the left lung base.  Patient had 

blood cultures sent during that ER visit.  Patient was discharged home with 

instructions to follow up with Dr. Quiles on an outpatient basis.  Yesterday

she was called to come back to the emergency room for evaluation in view of 

blood cultures from 01/20/2021 and positive for gram-positive cocci.  Patient 

was admitted for IV antibiotics, she was started on Unasyn and vancomycin.  

Repeat blood cultures have been sent, chest x-ray showed no acute process.  

Admission lab data was reviewed showing with a total count of 11.2, hemoglobin 

of 13.5, d-dimer 0.36, sodium is 141, potassium is 3.4, the rest of electrolytes

and renal profile were unremarkable, LFTs were within normal limits, plasma 

lactic acid was 1.0, pro calcitonin level was negative at 0.04, patient is 

breathing comfortably, she appears to be in no acute distress, she's been 

afebrile, vital signs have been stable, she remains on room air with a pulse ox 

of 93%.  Breathing is nonlabored.  No cough no phlegm production.  No chest 

discomfort.








Review of Systems


All systems: negative


Constitutional: Denies chills, Denies fever


Eyes: denies blurred vision, denies pain


Ears, nose, mouth and throat: Denies headache, Denies sore throat


Cardiovascular: Denies chest pain, Denies shortness of breath


Respiratory: Reports cough, Reports dyspnea, Reports wheezing


Gastrointestinal: Denies abdominal pain, Denies diarrhea, Denies nausea, Denies 

vomiting


Genitourinary: Denies dysuria, Denies hematuria


Musculoskeletal: Denies myalgias


Integumentary: Denies pruritus, Denies rash


Neurological: Denies numbness, Denies weakness


Psychiatric: Denies anxiety, Denies depression


Endocrine: Denies fatigue, Denies weight change





Past Medical History


Past Medical History: Asthma, GERD/Reflux, Sleep Apnea/CPAP/BIPAP


Additional Past Medical History / Comment(s): SHORTNESS OF BREATH WITH ACTIVITY,


History of Any Multi-Drug Resistant Organisms: None Reported


Past Surgical History: Hysterectomy


Additional Past Surgical History / Comment(s): novasure uterine ablation, spinal

surgery t6-7 fused, breast augmentation, breast biopsies


Past Anesthesia/Blood Transfusion Reactions: Previous Problems w/ Anesthesia


Additional Past Anesthesia/Blood Transfusion Reaction / Comment(s): "SEVERE SORE

THROAT WITH INTUBATION"


Past Psychological History: No Psychological Hx Reported


Smoking Status: Never smoker


Past Alcohol Use History: None Reported


Additional Past Alcohol Use History / Comment(s): STARTED SMOKING AT AGE 17, 

QUIT IN 2009 SMOKED 1/2PPD


Past Drug Use History: None Reported





- Past Family History


  ** Father


Family Medical History: Cancer, Diabetes Mellitus


Additional Family Medical History / Comment(s): small cell lung ca





  ** Mother


Additional Family Medical History / Comment(s): ALS





Medications and Allergies


                                Home Medications











 Medication  Instructions  Recorded  Confirmed  Type


 


Cyclobenzaprine [Flexeril] 10 mg PO TID PRN 07/06/19 01/25/21 History


 


Dextroamphetamine/Amphetamine 20 mg PO BID 07/06/19 01/25/21 History





[Adderall]    


 


Ergocalciferol (Vitamin D2) 50,000 unit PO WE 07/06/19 01/25/21 History





[Vitamin D2]    


 


Ibuprofen [Motrin] 800 mg PO TID PRN 07/06/19 01/25/21 History


 


predniSONE [Deltasone] 40 mg PO DAILY #8 tab 01/20/21 01/25/21 Rx


 


Albuterol Sulfate [Ventolin HFA] 1 - 2 puff INHALATION RT-QID PRN 01/25/21 01/25/21 History


 


Ascorbic Acid [Vitamin C] 1,000 mg PO DAILY 01/25/21 01/25/21 History


 


Benzonatate [Benzonatate Perle] 200 mg PO TID PRN 01/25/21 01/25/21 History


 


Loratadine [Claritin] 10 mg PO DAILY PRN 01/25/21 01/25/21 History


 


Mometasone/Formoterol [Dulera 200 2 puff PO RT-BID 01/25/21 01/25/21 History





Mcg-5 Mcg Inhaler]    


 


Zinc 50 mg PO DAILY 01/25/21 01/25/21 History


 


Zolpidem [Ambien] 10 mg PO HS PRN 01/25/21 01/25/21 History


 


traMADol HCL/ACETAMINOPHEN 1 tab PO TID PRN 01/25/21 01/25/21 History





[Ultracet 37.5-325]    








                                    Allergies











Allergy/AdvReac Type Severity Reaction Status Date / Time


 


adhesive Allergy  Rash/Hives Verified 01/25/21 15:58


 


cat dander Allergy  Unknown Verified 01/25/21 15:58


 


dog dander Allergy  Unknown Verified 01/25/21 15:58


 


meperidine HCl [From Demerol] Allergy  Nausea & Verified 01/25/21 15:58





   Vomiting  


 


mold Allergy  Unknown Verified 01/25/21 15:58


 


DUST Allergy  Unknown Uncoded 01/25/21 14:08














Physical Exam


Vitals: 


                                   Vital Signs











  Temp Pulse Pulse Resp BP BP Pulse Ox


 


 01/26/21 10:25  98.1 F   60  18   131/74  93 L


 


 01/26/21 01:14  97.8 F   62  18   121/62  93 L


 


 01/25/21 20:00    68  17   


 


 01/25/21 19:10  98.5 F   68  17   145/74  94 L


 


 01/25/21 18:59  98.5 F  61   20  135/58   93 L


 


 01/25/21 15:42     20   


 


 01/25/21 15:34   61   20  135/58   93 L








                                Intake and Output











 01/25/21 01/26/21 01/26/21





 22:59 06:59 14:59


 


Other:   


 


  Voiding Method Toilet  


 


  # Voids  2 


 


  Weight 127.006 kg  











 GENERAL EXAM: Alert, comfortable obese, 50-year-old white female on room air, 

with a pulse ox of 93% in no apparent distress.


HEAD: Normocephalic/atraumatic.


EYES: Normal reaction of pupils, equal size.  Conjunctiva pink, sclera white.


NOSE: Clear with pink turbinates.


THROAT: No erythema or exudates.


NECK: No masses, no JVD, no thyroid enlargement, no adenopathy.


CHEST: No chest wall deformity.  Symmetrical expansion. 


LUNGS: Equal air entry with no crackles, wheeze, rhonchi or dullness.


CVS: Regular rate and rhythm, normal S1 and S2, no gallops, no murmurs, no rubs


ABDOMEN: Soft, nontender.  No hepatosplenomegaly, normal bowel sounds, no 

guarding or rigidity.


EXTREMITIES: No clubbing, no edema, no cyanosis, 2+ pulses and upper and lower 

extremities.


MUSCULOSKELETAL: Muscle strength and tone normal.


SPINE: No scoliosis or deformity


SKIN: No rashes


CENTRAL NERVOUS SYSTEM: Alert and oriented -3.  No focal deficits, tone is n

ormal in all 4 extremities.


PSYCHIATRIC: Alert and oriented -3.  Appropriate affect.  Intact judgment and 

insight.











Results





- Laboratory Findings


CBC and BMP: 


                                 01/25/21 15:15





                                 01/25/21 15:15


PT/INR, D-dimer











D-Dimer  0.36 mg/L FEU (<0.60)   01/25/21  18:36    








Abnormal lab findings: 


                                  Abnormal Labs











  01/25/21 01/25/21 01/25/21





  15:15 15:15 20:37


 


WBC  11.2 H  


 


Potassium   3.4 L 


 


POC Glucose (mg/dL)    167 H














  01/26/21 01/26/21 01/26/21





  06:57 07:19 11:53


 


WBC   


 


Potassium   


 


POC Glucose (mg/dL)  307 H  281 H  280 H














- Diagnostic Findings


Chest x-ray: report reviewed, image reviewed





Assessment and Plan


Plan: 


 Assessment:





#1.  Dyspnea, mild hypoxic respiratory failure, multifactorial, related to 

recent history of COVID 19 infection, and possibility of bacteremia.  Chest x-

ray showed no acute process





#2.  Gram-positive cocci bacteremia in the blood cultures from 01/20/2021, 

possibly contaminated sample, patient admitted for IV antibiotics, currently on 

Unasyn and vancomycin, awaiting results of final blood cultures, and repeat 

blood cultures





#3.  Recent COVID 19 infection diagnosed on an outpatient basis at the [facility] urgent care clinic on 01/20/2021 with an outpatient basis with steroids 

and antibiotics





#4.  History of chronic bronchial asthma, unspecified





#5.  Morbid obesity





#6.  Obstructive sleep apnea on CPAP





#7.  GERD/reflux





#8.  Nonsmoker





Plan:





Continue current antibiotics, will await results of the final blood cultures and

repeat blood cultures, possibility of the sample was contaminated as the patient

clinically does not look septic, she looks nontoxic, no worsening dyspnea, chest

x-ray was reviewed showing no acute process, she is on room air, appears to be 

in no acute distress.  Continue with Symbicort, Decadron, continue prophylactic 

doses of Lovenox.  Procalcitonin level was negative, CRP nonelevated, d-dimer 

within normal limits.  No indication for Remdesivir, continue supportive 

treatment





I performed a history & physical examination of the patient and discussed their 

management with my nurse practitioner, Ave Nicole.  I reviewed the nurse 

practitioner's note and agree with the documented findings and plan of care.  

Lung sounds are positive for diminished breath sounds  The findings and the 

impression was discussed with the patient.  I attest to the documentation by the

nurse practitioner. 





Time with Patient: Greater than 30

## 2021-01-27 LAB
GLUCOSE BLD-MCNC: 261 MG/DL (ref 75–99)
GLUCOSE BLD-MCNC: 270 MG/DL (ref 75–99)
GLUCOSE BLD-MCNC: 278 MG/DL (ref 75–99)
GLUCOSE BLD-MCNC: 303 MG/DL (ref 75–99)

## 2021-01-27 RX ADMIN — ALBUTEROL SULFATE PRN PUFF: 90 AEROSOL, METERED RESPIRATORY (INHALATION) at 08:31

## 2021-01-27 RX ADMIN — COLCHICINE SCH MG: 0.6 TABLET, FILM COATED ORAL at 08:05

## 2021-01-27 RX ADMIN — INSULIN ASPART SCH UNIT: 100 INJECTION, SOLUTION INTRAVENOUS; SUBCUTANEOUS at 13:07

## 2021-01-27 RX ADMIN — AMPICILLIN SODIUM AND SULBACTAM SODIUM SCH MLS/HR: 2; 1 INJECTION, POWDER, FOR SOLUTION INTRAMUSCULAR; INTRAVENOUS at 08:05

## 2021-01-27 RX ADMIN — INSULIN ASPART SCH UNIT: 100 INJECTION, SOLUTION INTRAVENOUS; SUBCUTANEOUS at 20:12

## 2021-01-27 RX ADMIN — ALBUTEROL SULFATE PRN PUFF: 90 AEROSOL, METERED RESPIRATORY (INHALATION) at 20:24

## 2021-01-27 RX ADMIN — INSULIN ASPART SCH UNIT: 100 INJECTION, SOLUTION INTRAVENOUS; SUBCUTANEOUS at 17:42

## 2021-01-27 RX ADMIN — CEFAZOLIN SCH: 330 INJECTION, POWDER, FOR SOLUTION INTRAMUSCULAR; INTRAVENOUS at 17:36

## 2021-01-27 RX ADMIN — INSULIN ASPART SCH UNIT: 100 INJECTION, SOLUTION INTRAVENOUS; SUBCUTANEOUS at 17:41

## 2021-01-27 RX ADMIN — ENOXAPARIN SODIUM SCH MG: 40 INJECTION SUBCUTANEOUS at 08:07

## 2021-01-27 RX ADMIN — ZOLPIDEM TARTRATE PRN MG: 10 TABLET, FILM COATED ORAL at 22:21

## 2021-01-27 RX ADMIN — ALBUTEROL SULFATE PRN PUFF: 90 AEROSOL, METERED RESPIRATORY (INHALATION) at 16:11

## 2021-01-27 RX ADMIN — SODIUM CHLORIDE SCH MLS/HR: 9 INJECTION, SOLUTION INTRAVENOUS at 05:00

## 2021-01-27 RX ADMIN — AMPICILLIN SODIUM AND SULBACTAM SODIUM SCH MLS/HR: 2; 1 INJECTION, POWDER, FOR SOLUTION INTRAMUSCULAR; INTRAVENOUS at 00:03

## 2021-01-27 RX ADMIN — Medication SCH MG: at 08:05

## 2021-01-27 RX ADMIN — ALBUTEROL SULFATE PRN PUFF: 90 AEROSOL, METERED RESPIRATORY (INHALATION) at 12:04

## 2021-01-27 RX ADMIN — INSULIN DETEMIR SCH UNIT: 100 INJECTION, SOLUTION SUBCUTANEOUS at 08:06

## 2021-01-27 RX ADMIN — PANTOPRAZOLE SODIUM SCH MG: 40 TABLET, DELAYED RELEASE ORAL at 08:05

## 2021-01-27 RX ADMIN — OXYCODONE HYDROCHLORIDE AND ACETAMINOPHEN SCH MG: 500 TABLET ORAL at 08:05

## 2021-01-27 RX ADMIN — INSULIN ASPART SCH UNIT: 100 INJECTION, SOLUTION INTRAVENOUS; SUBCUTANEOUS at 08:06

## 2021-01-27 RX ADMIN — BUDESONIDE AND FORMOTEROL FUMARATE DIHYDRATE SCH PUFF: 160; 4.5 AEROSOL RESPIRATORY (INHALATION) at 08:31

## 2021-01-27 RX ADMIN — COLCHICINE SCH MG: 0.6 TABLET, FILM COATED ORAL at 20:13

## 2021-01-27 RX ADMIN — BUDESONIDE AND FORMOTEROL FUMARATE DIHYDRATE SCH PUFF: 160; 4.5 AEROSOL RESPIRATORY (INHALATION) at 20:24

## 2021-01-27 RX ADMIN — INSULIN DETEMIR SCH: 100 INJECTION, SOLUTION SUBCUTANEOUS at 08:16

## 2021-01-28 VITALS — DIASTOLIC BLOOD PRESSURE: 73 MMHG | SYSTOLIC BLOOD PRESSURE: 149 MMHG | RESPIRATION RATE: 19 BRPM | HEART RATE: 52 BPM

## 2021-01-28 VITALS — TEMPERATURE: 98.2 F

## 2021-01-28 LAB — GLUCOSE BLD-MCNC: 251 MG/DL (ref 75–99)

## 2021-01-28 PROCEDURE — 5A09357 ASSISTANCE WITH RESPIRATORY VENTILATION, LESS THAN 24 CONSECUTIVE HOURS, CONTINUOUS POSITIVE AIRWAY PRESSURE: ICD-10-PCS

## 2021-01-28 RX ADMIN — Medication SCH MG: at 07:51

## 2021-01-28 RX ADMIN — ENOXAPARIN SODIUM SCH MG: 40 INJECTION SUBCUTANEOUS at 07:50

## 2021-01-28 RX ADMIN — BUDESONIDE AND FORMOTEROL FUMARATE DIHYDRATE SCH PUFF: 160; 4.5 AEROSOL RESPIRATORY (INHALATION) at 08:15

## 2021-01-28 RX ADMIN — ALBUTEROL SULFATE PRN PUFF: 90 AEROSOL, METERED RESPIRATORY (INHALATION) at 08:15

## 2021-01-28 RX ADMIN — OXYCODONE HYDROCHLORIDE AND ACETAMINOPHEN SCH MG: 500 TABLET ORAL at 07:51

## 2021-01-28 RX ADMIN — PANTOPRAZOLE SODIUM SCH MG: 40 TABLET, DELAYED RELEASE ORAL at 07:51

## 2021-01-28 RX ADMIN — INSULIN ASPART SCH UNIT: 100 INJECTION, SOLUTION INTRAVENOUS; SUBCUTANEOUS at 07:52

## 2021-01-28 RX ADMIN — ENOXAPARIN SODIUM SCH MG: 40 INJECTION SUBCUTANEOUS at 07:51

## 2021-01-28 RX ADMIN — INSULIN DETEMIR SCH UNIT: 100 INJECTION, SOLUTION SUBCUTANEOUS at 07:52

## 2021-01-28 RX ADMIN — INSULIN ASPART SCH UNIT: 100 INJECTION, SOLUTION INTRAVENOUS; SUBCUTANEOUS at 07:51

## 2021-01-28 RX ADMIN — COLCHICINE SCH MG: 0.6 TABLET, FILM COATED ORAL at 07:51

## 2021-01-28 NOTE — P.DS
Providers


Date of admission: 


01/25/21 18:22





Expected date of discharge: 01/28/21


Attending physician: 


Giovani Ortiz





Consults: 





                                        





01/25/21 19:27


Consult Physician Routine 


   Consulting Provider: Norma Spivey


   Consult Reason/Comments: COVID 19


   Do you want consulting provider notified?: Yes





01/25/21 19:28


Consult Physician Routine 


   Consulting Provider: Mirtha Green


   Consult Reason/Comments: positive Blood Culture


   Do you want consulting provider notified?: Yes











Primary care physician: 


Patrick Quiles





[facility] Course: 





HISTORY OF PRESENT ILLNESS


50-year-old female one of Dr. Quiles patient with history of mildly 

overweight, history of asthma, GERD, obstructive sleep apnea, who apparently 

through exposure to family member developed to have code 19 was diagnosed in 

01/14/2021 at [facility] walk-in clinic.  Patient was quarantined ~21 developed

to have significant dyspnea and shortness of breath with significant hypoxia 

ended up coming to the emergency department chest x-ray and blood culture were 

done her culture came back positive for gram-positive cocci.  Patient was called

to come to the emergency room or go see her primary care physician was seen and 

evaluated [facility] department found to have significant hypoxia and white blood 

cell and climb up to 11,000 electrolyte still normal lactic acid was normal C-

reactive protein is 7.5 and ferritin level was low from before.  Chest x-ray 

shows no acute process of the time patient was having more bronchitis and 

pneumonia chest x-ray from the  care back with minimal atelectasis of the left

base.  Patient was giving 3, off Unasyn which should be continued for now also 

was giving 2 g of vancomycin 1 time and to follow with infectious disease after 

doing another blood culture today.


Patient still hypoxic her  was admitted last week for Covid 19 

pneumonitis with hypoxia and was in for 5 days discharged home on Sunday.  

Patient was hospitalized from [facility] department will consult infectious disease 

and pulmonary.  Continue Decadron and multivitamin for the Covid 19 complication

but no need for monoclonal anti-body or antiviral medication.





1/26: Patient states that she has shortness of breath with activity.  She 

complains of congestion very little cough.  She utilizes CPAP during the night. 

Repeat blood cultures are status received.  She has been afebrile, heart rate 

62, blood pressure 121/62, pulse ox 93% on room air.  Blood sugars are elevated 

patient has borderline diabetes on diet control only.  We will add in scheduled 

Levemir daily and NovoLog scale.  Consult in place troponin medicine and 

infectious disease.





1/27: Patient denies any new complaints.  Breathing status is stable.  She has 

occasional cough patient has been afebrile, heart rate 52, blood pressure 

130/74, pulse ox 95% on room air.  Laboratory was contacted regarding blood 

culture from January 20.  The culture has been plated but nothing is growing at 

this time but are unable to finalize the culture.  Dr. Green has discontinued 

all IV antibiotics and plan to monitor for 24 hours.  If patient remains stable,

culture may be finalized within 24 hours, patient will likely be able to 

discharge tomorrow.





1/28: 1 blood culture from January 20 showing nonhemolytic strep.  Blood 

cultures obtained on January 25 are both negative.  Dr. Green believes this is a

contamination but recommends Augmentin for discharge.  Patient has been 

afebrile, heart rate 52, blood pressure 149/73.  Pulse ox 96% on room air.  

Creatinine 0.8.  Blood sugars in the mid 200s.  Patient voiced that she will not

go home on insulin.  We'll plan to discontinue steroids to improve blood sugars.

 Patient will need follow-up with Dr. Quiles to address at a later time.  

Hemoglobin A1c was 6.4.  Patient denies having any shortness of breath.  She has

rare cough.  No abdominal pain, nausea or vomiting.  Patient will be discharged 

home today in stable condition.








ASSESSMENT AND PLAN


1 severe dyspnea and shortness of breath: Still mild complication of Covid 19 

pneumonitis from January 14


2 positive blood culture with one culture positive for nonhemolytic strep, most 

likely contamination.  


3 mild intermittent asthma


4 history of ADD


5 chronic pain management


6 vitamin D deficiency


7 electrolyte imbalance with hypokalemia.


8 type 2 diabetes: Diet control, uncontrolled with hyperglycemia secondary to 

steroids. 





DISCHARGE PLAN


Home 





Impression and plan of care have been directed as dictated by the signing 

physician.  Elizabeth Fonseca nurse practitioner acting as scribe for signing 

physician.


Patient Condition at Discharge: Good





Plan - Discharge Summary


Discharge Rx Participant: Yes


New Discharge Prescriptions: 


New


   Amoxicillin/Potassium Clav [Augmentin 875-125 Tablet] 1 tab PO BID 10 Days 

#20 tab


   Budesonide/Formoterol Fumarate [Symbicort 80-4.5 Mcg Inhaler] 2 puff 

INHALATION BID #1 inhaler


   Albuterol Inhaler [Ventolin Hfa Inhaler] 2 puff INHALATION RT-TID #2 puff





Continue


   Ergocalciferol (Vitamin D2) [Vitamin D2] 50,000 unit PO WE


   Ibuprofen [Motrin] 800 mg PO TID PRN


     PRN Reason: Pain


   Dextroamphetamine/Amphetamine [Adderall] 20 mg PO BID


   Cyclobenzaprine [Flexeril] 10 mg PO TID PRN


     PRN Reason: Muscle Pain


   Benzonatate [Benzonatate Perle] 200 mg PO TID PRN


     PRN Reason: Cough


   Albuterol Sulfate [Ventolin HFA] 1 - 2 puff INHALATION RT-QID PRN


     PRN Reason: Shortness Of Breath


   Ascorbic Acid [Vitamin C] 1,000 mg PO DAILY


   Loratadine [Claritin] 10 mg PO DAILY PRN


     PRN Reason: COLD OR ALLERGY SYMPTOMS


   Mometasone/Formoterol [Dulera 200 Mcg-5 Mcg Inhaler] 2 puff PO RT-BID


   traMADol HCL/ACETAMINOPHEN [Ultracet 37.5-325] 1 tab PO TID PRN


     PRN Reason: Pain


   Zinc 50 mg PO DAILY


   Zolpidem [Ambien] 10 mg PO HS PRN


     PRN Reason: Insomnia





Discontinued


   predniSONE [Deltasone] 40 mg PO DAILY #8 tab


Discharge Medication List





Cyclobenzaprine [Flexeril] 10 mg PO TID PRN 07/06/19 [History]


Dextroamphetamine/Amphetamine [Adderall] 20 mg PO BID 07/06/19 [History]


Ergocalciferol (Vitamin D2) [Vitamin D2] 50,000 unit PO WE 07/06/19 [History]


Ibuprofen [Motrin] 800 mg PO TID PRN 07/06/19 [History]


Albuterol Sulfate [Ventolin HFA] 1 - 2 puff INHALATION RT-QID PRN 01/25/21 

[History]


Ascorbic Acid [Vitamin C] 1,000 mg PO DAILY 01/25/21 [History]


Benzonatate [Benzonatate Perle] 200 mg PO TID PRN 01/25/21 [History]


Loratadine [Claritin] 10 mg PO DAILY PRN 01/25/21 [History]


Mometasone/Formoterol [Dulera 200 Mcg-5 Mcg Inhaler] 2 puff PO RT-BID 01/25/21 

[History]


Zinc 50 mg PO DAILY 01/25/21 [History]


Zolpidem [Ambien] 10 mg PO HS PRN 01/25/21 [History]


traMADol HCL/ACETAMINOPHEN [Ultracet 37.5-325] 1 tab PO TID PRN 01/25/21 

[History]


Albuterol Inhaler [Ventolin Hfa Inhaler] 2 puff INHALATION RT-TID #2 puff 

01/28/21 [Rx]


Amoxicillin/Potassium Clav [Augmentin 875-125 Tablet] 1 tab PO BID 10 Days #20 

tab 01/28/21 [Rx]


Budesonide/Formoterol Fumarate [Symbicort 80-4.5 Mcg Inhaler] 2 puff INHALATION 

BID #1 inhaler 01/28/21 [Rx]








Follow up Appointment(s)/Referral(s): 


Patrick Quiles DO [Primary Care Provider] - 1 Week (Follow up as scheduled)


Discharge Disposition: HOME SELF-CARE

## 2021-08-25 ENCOUNTER — HOSPITAL ENCOUNTER (OUTPATIENT)
Dept: HOSPITAL 47 - RADMAMWWP | Age: 51
Discharge: HOME | End: 2021-08-25
Attending: FAMILY MEDICINE
Payer: COMMERCIAL

## 2021-08-25 DIAGNOSIS — Z12.31: Primary | ICD-10-CM

## 2021-08-25 PROCEDURE — 77067 SCR MAMMO BI INCL CAD: CPT

## 2021-08-30 NOTE — MM
Reason for exam: screening  (asymptomatic).

Last mammogram was performed 1 year and 8 months ago.



History:

Patient is postmenopausal.

Reductions of both breasts, December 2007.  Excisional biopsy of the right breast,

2002.  Benign excisional biopsy of the left breast.



Physical Findings:

A clinical breast exam by your physician is recommended on an annual basis and 

results should be correlated with mammographic findings.



MG Screening Mammo w CAD

Bilateral CC and MLO view(s) were taken.

Prior study comparison: December 24, 2019, bilateral MG screening mammo w CAD.  

December 19, 2018, bilateral MG screening mammo w CAD.  September 6, 2017, 

bilateral MG screening mammo w CAD.

There are scattered fibroglandular densities.  Stable retained skin staple on the 

left breast. Benign oil cyst calcifications. Nodular asymmetric density central 

right MLO view is more defined.





ASSESSMENT: Incomplete: need additional imaging evaluation, BI-RAD 0



RECOMMENDATION:

Special view mammogram of the right breast.

(3D)



If lesion persists on supplemental views, image directed ultrasound is 

recommended.



[facility] will attempt to contact patient to return for supplemental 

views and ultrasound if indicated.

## 2021-09-03 ENCOUNTER — HOSPITAL ENCOUNTER (OUTPATIENT)
Dept: HOSPITAL 47 - RADMAMWWP | Age: 51
Discharge: HOME | End: 2021-09-03
Attending: FAMILY MEDICINE
Payer: COMMERCIAL

## 2021-09-03 DIAGNOSIS — Z78.0: ICD-10-CM

## 2021-09-03 DIAGNOSIS — N64.89: Primary | ICD-10-CM

## 2021-09-03 PROCEDURE — 77065 DX MAMMO INCL CAD UNI: CPT

## 2021-09-07 NOTE — MM
Reason for exam: additional evaluation requested from abnormal screening.

Last mammogram was performed less than 1 month ago.



History:

Patient is postmenopausal.

Reductions of both breasts, December 2007.  Excisional biopsy of the right breast,

2002.  Benign excisional biopsy of the left breast.



Physical Findings:

Nurse did not find any significant physical abnormalities on exam.



MG Work Up Mamm w CAD RT

LM and spot compression MLO view(s) were taken of the right breast.

Prior study comparison: August 25, 2021, bilateral MG screening mammo w CAD.  

December 24, 2019, bilateral MG screening mammo w CAD.  December 19, 2018, 

bilateral MG screening mammo w CAD.

There are scattered fibroglandular densities.  There is no discrete abnormality 

including area of concern on compression.



These results were verbally communicated with the patient and result sheet given 

to the patient on 9/3/21.





ASSESSMENT: Probably benign, BI-RAD 3



RECOMMENDATION:

Follow-up diagnostic mammogram of the right breast in 6 months.

## 2021-11-12 ENCOUNTER — HOSPITAL ENCOUNTER (OUTPATIENT)
Dept: HOSPITAL 47 - RADXRMAIN | Age: 51
Discharge: HOME | End: 2021-11-12
Attending: FAMILY MEDICINE
Payer: COMMERCIAL

## 2021-11-12 DIAGNOSIS — R91.8: Primary | ICD-10-CM

## 2021-11-12 PROCEDURE — 71046 X-RAY EXAM CHEST 2 VIEWS: CPT

## 2021-11-13 NOTE — XR
EXAMINATION TYPE: XR chest 2V

 

DATE OF EXAM: 11/12/2021

 

COMPARISON: 1/25/2021

 

INDICATION: Chest tightness chest pain

 

TECHNIQUE:  Frontal and lateral views of the chest are obtained.

 

FINDINGS:  

The heart size is normal.  

The pulmonary vasculature is normal.

Minimal infiltrate may be at the cardiac apex. Correlate for atelectasis. Early pneumonia could be co
nsidered.

 

IMPRESSION:  

1. Minimal left lower lobe infiltrate. Correlate for atelectasis or pneumonia

## 2021-11-22 ENCOUNTER — HOSPITAL ENCOUNTER (OUTPATIENT)
Dept: HOSPITAL 47 - RADXRMAIN | Age: 51
Discharge: HOME | End: 2021-11-22
Attending: FAMILY MEDICINE
Payer: COMMERCIAL

## 2021-11-22 DIAGNOSIS — R91.8: Primary | ICD-10-CM

## 2021-11-22 DIAGNOSIS — I72.0: ICD-10-CM

## 2021-11-22 PROCEDURE — 71046 X-RAY EXAM CHEST 2 VIEWS: CPT

## 2021-11-22 NOTE — XR
EXAMINATION TYPE: XR chest 2V

 

DATE OF EXAM: 11/22/2021

 

COMPARISON: 11/12/2021

 

HISTORY: 50-year-old female I72.0, aneurysm of carotid artery.

 

TECHNIQUE:  Frontal and lateral views

 

FINDINGS:  

Heart upper limits of normal in size. Stable patchy opacity at the inferior lingula. Heart remains up
per limits of normal in size. ACDF hardware. No pleural effusion.

 

 

IMPRESSION:  

Stable focal left basilar opacity, atelectasis versus pneumonia.

## 2021-12-13 ENCOUNTER — HOSPITAL ENCOUNTER (OUTPATIENT)
Dept: HOSPITAL 47 - RADCTMAIN | Age: 51
Discharge: HOME | End: 2021-12-13
Attending: FAMILY MEDICINE
Payer: COMMERCIAL

## 2021-12-13 DIAGNOSIS — K76.0: Primary | ICD-10-CM

## 2021-12-13 DIAGNOSIS — R91.1: ICD-10-CM

## 2021-12-13 PROCEDURE — 71270 CT THORAX DX C-/C+: CPT

## 2021-12-13 NOTE — CT
EXAMINATION TYPE: CT chest wo/w con

 

DATE OF EXAM: 12/13/2021

 

COMPARISON: Chest x-ray 11/22/2021, CT scan 4/3/2015

 

HISTORY: Abnormal chest xray. Difficulty breathing.

 

CT DLP: 1684 mGycm

Automated exposure control for dose reduction was used.

 

TECHNIQUE: 

CT scan of the chest is performed without and with IV Contrast, patient injected with 100ml mL of Iso
azul 300.  MIP Images are created on CT scanner and reviewed. 3D reconstructed images are created on a
n independent workstation and reviewed.

 

FINDINGS:

 

LUNGS: The lungs are grossly clear, there is no concerning consolidative pneumonia. 2 mm nodule left 
lower lobe superior segment axial image 31 stable from prior exam and therefore benign.   There is no
 pleural effusion or pneumothorax seen.  The tracheobronchial tree is patent. Subsegmental changes in
volving the left lung most typical of atelectasis.

 

MEDIASTINUM: There are no greater than 1 cm hilar or mediastinal lymph nodes.   No pericardial effusi
on is seen.  

 

OTHER:  Findings compatible with hepatic steatosis. Hypertrophic and degenerative changes of the spin
e. Postsurgical changes overlying the cervical spine. Calcifications in the breasts noted.

 

IMPRESSION:  

1.

## 2022-02-24 ENCOUNTER — HOSPITAL ENCOUNTER (OUTPATIENT)
Dept: HOSPITAL 47 - LABWHC1 | Age: 52
Discharge: HOME | End: 2022-02-24
Attending: FAMILY MEDICINE
Payer: COMMERCIAL

## 2022-02-24 DIAGNOSIS — E55.9: Primary | ICD-10-CM

## 2022-02-24 LAB
ALBUMIN SERPL-MCNC: 4.5 G/DL (ref 3.8–4.9)
ALBUMIN/GLOB SERPL: 2.06 G/DL (ref 1.6–3.17)
ALP SERPL-CCNC: 93 U/L (ref 41–126)
ALT SERPL-CCNC: 40 U/L (ref 8–44)
ANION GAP SERPL CALC-SCNC: 10.4 MMOL/L (ref 10–18)
AST SERPL-CCNC: 35 U/L (ref 13–35)
BASOPHILS # BLD AUTO: 0.03 X 10*3/UL (ref 0–0.1)
BASOPHILS NFR BLD AUTO: 0.4 %
BUN SERPL-SCNC: 18.1 MG/DL (ref 9–27)
BUN/CREAT SERPL: 19.7 RATIO (ref 12–20)
CALCIUM SPEC-MCNC: 9 MG/DL (ref 8.7–10.3)
CHLORIDE SERPL-SCNC: 107 MMOL/L (ref 96–109)
CO2 SERPL-SCNC: 25 MMOL/L (ref 20–27.5)
EOSINOPHIL # BLD AUTO: 0.3 X 10*3/UL (ref 0.04–0.35)
EOSINOPHIL NFR BLD AUTO: 3.6 %
ERYTHROCYTE [DISTWIDTH] IN BLOOD BY AUTOMATED COUNT: 4.01 X 10*6/UL (ref 4.1–5.2)
ERYTHROCYTE [DISTWIDTH] IN BLOOD: 12.8 % (ref 11.5–14.5)
GLOBULIN SER CALC-MCNC: 2.2 G/DL (ref 1.6–3.3)
GLUCOSE SERPL-MCNC: 119 MG/DL (ref 70–110)
HCT VFR BLD AUTO: 38.6 % (ref 37.2–46.3)
HGB BLD-MCNC: 12.4 G/DL (ref 12–15)
IMM GRANULOCYTES BLD QL AUTO: 0.2 %
LYMPHOCYTES # SPEC AUTO: 3.2 X 10*3/UL (ref 0.9–5)
LYMPHOCYTES NFR SPEC AUTO: 38 %
MCH RBC QN AUTO: 30.9 PG (ref 27–32)
MCHC RBC AUTO-ENTMCNC: 32.1 G/DL (ref 32–37)
MCV RBC AUTO: 96.3 FL (ref 80–97)
MONOCYTES # BLD AUTO: 0.44 X 10*3/UL (ref 0.2–1)
MONOCYTES NFR BLD AUTO: 5.2 %
NEUTROPHILS # BLD AUTO: 4.42 X 10*3/UL (ref 1.8–7.7)
NEUTROPHILS NFR BLD AUTO: 52.6 %
NRBC BLD AUTO-RTO: 0 /100 WBCS (ref 0–0)
PLATELET # BLD AUTO: 183 X 10*3/UL (ref 140–440)
POTASSIUM SERPL-SCNC: 4 MMOL/L (ref 3.5–5.5)
PROT SERPL-MCNC: 6.7 G/DL (ref 6.2–8.2)
RHEUMATOID FACT SERPL-ACNC: <10 IU/ML (ref 0–15)
SODIUM SERPL-SCNC: 142 MMOL/L (ref 135–145)
WBC # BLD AUTO: 8.41 X 10*3/UL (ref 4.5–10)

## 2022-02-24 PROCEDURE — 86431 RHEUMATOID FACTOR QUANT: CPT

## 2022-02-24 PROCEDURE — 86225 DNA ANTIBODY NATIVE: CPT

## 2022-02-24 PROCEDURE — 82164 ANGIOTENSIN I ENZYME TEST: CPT

## 2022-02-24 PROCEDURE — 36415 COLL VENOUS BLD VENIPUNCTURE: CPT

## 2022-02-24 PROCEDURE — 80053 COMPREHEN METABOLIC PANEL: CPT

## 2022-02-24 PROCEDURE — 86812 HLA TYPING A B OR C: CPT

## 2022-02-24 PROCEDURE — 86038 ANTINUCLEAR ANTIBODIES: CPT

## 2022-02-24 PROCEDURE — 85652 RBC SED RATE AUTOMATED: CPT

## 2022-02-24 PROCEDURE — 85025 COMPLETE CBC W/AUTO DIFF WBC: CPT

## 2022-02-24 PROCEDURE — 82103 ALPHA-1-ANTITRYPSIN TOTAL: CPT

## 2022-02-24 PROCEDURE — 82306 VITAMIN D 25 HYDROXY: CPT

## 2022-02-25 LAB
ACE SERPL-CCNC: 36 U/L (ref 8–52)
DSDNA AB SER QL: NEGATIVE
DSDNA AB TITR SER: <1 IU/ML
HLA-B27 QL: NEGATIVE

## 2022-03-11 ENCOUNTER — HOSPITAL ENCOUNTER (OUTPATIENT)
Dept: HOSPITAL 47 - RADMAMWWP | Age: 52
Discharge: HOME | End: 2022-03-11
Attending: FAMILY MEDICINE
Payer: COMMERCIAL

## 2022-03-11 DIAGNOSIS — Z78.0: ICD-10-CM

## 2022-03-11 DIAGNOSIS — R92.8: Primary | ICD-10-CM

## 2022-03-11 PROCEDURE — 77065 DX MAMMO INCL CAD UNI: CPT

## 2022-03-14 NOTE — MM
Reason for exam: follow-up at short interval from prior study.

Last mammogram was performed 6 months ago.



History:

Patient is postmenopausal.

Reductions of both breasts, December 2007.  Excisional biopsy of the right breast,

2002.  Benign excisional biopsy of the left breast.



Physical Findings:

A clinical breast exam by your physician is recommended on an annual basis and 

results should be correlated with mammographic findings.



MG Diagnostic Mammo RT w CAD

CC and MLO view(s) were taken of the right breast.

Prior study comparison: September 3, 2021, right breast MG work up mamm w CAD RT. 

August 25, 2021, bilateral MG screening mammo w CAD.

The breast tissue is heterogeneously dense. This may lower the sensitivity of 

mammography.  There is stable chronic nodularity in the right breast. There is no 

dominant lesion.

No significant new findings when compared with previous films.



These results were verbally communicated with the patient and result sheet given 

to the patient on 3/11/22.





ASSESSMENT: Benign, BI-RAD 2



RECOMMENDATION:

Return to routine screening mammogram schedule for both breasts.

Back on schedule.

## 2022-04-22 NOTE — PN
PROGRESS NOTE



DATE OF SERVICE:

01/28/2021



REASON FOR FOLLOWUP:

Positive blood culture.



INTERVAL HISTORY:

The patient was seen on rounds this morning. The patient has been afebrile, has been

breathing comfortably, currently on room air.  Denies any chest pain.  Occasional

cough.  No abdominal pain or diarrhea.



PHYSICAL EXAMINATION:

Blood pressure 149/73 with a pulse of 52, temperature 98.2. She is 96% on room air.

General description is a middle-aged female up in the room in no distress.

RESPIRATORY SYSTEM: Unlabored breathing, decreased intensity of breath sounds.  No

wheeze.

HEART: S1, S2.  Regular rate and rhythm.

ABDOMEN:  Soft, no tenderness.



LABS:

Creatinine 0.8.  Blood cultures that were drawn on the 25th are negative. Blood

cultures on 20th showing nonhemolytic strep.



DIAGNOSTIC IMPRESSION AND PLAN:

Patient with a positive blood culture in this patient with no fever, no elevated white

count and subsequent blood cultures done 5 days later has been negative with no obvious

focus of infection, more likely pointing towards contamination. The patient slightly

apprehensive. Questions were answered in layman's terms.  Will give short course of

oral Augmentin and a close outpatient followup.  Discussed with the admitting team

working on discharge.





MMODL / IJN: [id number] / Job#: [id number]
Yes

## 2022-12-19 ENCOUNTER — HOSPITAL ENCOUNTER (OUTPATIENT)
Dept: HOSPITAL 47 - RADMAMWWP | Age: 52
Discharge: HOME | End: 2022-12-19
Attending: FAMILY MEDICINE
Payer: COMMERCIAL

## 2022-12-19 DIAGNOSIS — Z78.0: ICD-10-CM

## 2022-12-19 DIAGNOSIS — Z12.31: Primary | ICD-10-CM

## 2022-12-19 DIAGNOSIS — Z90.721: ICD-10-CM

## 2022-12-19 PROCEDURE — 77067 SCR MAMMO BI INCL CAD: CPT

## 2022-12-20 NOTE — MM
Reason for Exam: Screening  (asymptomatic). 

Last mammogram was performed 1 year(s) and 4 month(s) ago. 





Patient History: 

Menarche at age 13. First Full-Term Pregnancy at age 22. Left ovary removed at age 46. Right ovary

removed at age 46. Hysterectomy at age 46. Postmenopausal. Patient has history of breast feeding.

Patient used Hormonal Contraceptives for 10 years. Benign Excisional Biopsy on the left side. 2002,

Excisional Biopsy on the Right side. 12/2007, Bilateral Reduction. 





Risk Values: 

[place] 5 year model risk: 1.4%.

NCI Lifetime model risk: 11.7%.





Prior Study Comparison: 

10/1/1999 Bilateral Screening Mammogram, [facility]. 10/25/2011 Bilateral Diagnostic Mammogram,

[facility]. 10/25/2011 Right Diagnostic Ultrasound, [facility]. 11/23/2012 Bilateral Diagnostic Mammogram, [facility].

11/12/2014 Bilateral Screening Mammogram, [facility]. 9/6/2017 Bilateral Screening Mammogram, [facility].

12/19/2018 Bilateral Screening Mammogram, [facility]. 12/24/2019 Bilateral Screening Mammogram, [facility].

8/25/2021 Bilateral Screening Mammogram, [facility]. 9/3/2021 Right Diagnostic Mammogram, [facility]. 3/11/2022

Right Diagnostic Mammogram, [facility]. 





Tissue Density: 

There are scattered fibroglandular densities.





Findings: 

Analyzed By CAD. 

There is no suspicious group of microcalcifications or new suspicious mass in either breast. Chronic

nodularity within both breasts. Stable benign calcifications within both breasts. Stable retained

skin staple on the left breast. No significant change from prior exam. 





Overall Assessment: Benign, BI-RAD 2





Management: 

Screening Mammogram of both breasts in 1 year.

A clinical breast exam by your physician is recommended on an annual basis and results should be

correlated with mammographic findings.



Electronically signed and approved by: Marin Niño D.O.

## 2022-12-21 ENCOUNTER — HOSPITAL ENCOUNTER (OUTPATIENT)
Dept: HOSPITAL 47 - LABWHC1 | Age: 52
Discharge: HOME | End: 2022-12-21
Attending: INTERNAL MEDICINE
Payer: COMMERCIAL

## 2022-12-21 DIAGNOSIS — R06.09: Primary | ICD-10-CM

## 2022-12-21 PROCEDURE — 82785 ASSAY OF IGE: CPT

## 2022-12-21 PROCEDURE — 85008 BL SMEAR W/O DIFF WBC COUNT: CPT

## 2022-12-21 PROCEDURE — 36415 COLL VENOUS BLD VENIPUNCTURE: CPT

## 2022-12-21 PROCEDURE — 86003 ALLG SPEC IGE CRUDE XTRC EA: CPT

## 2022-12-22 LAB
A ALTERNATA IGE QN: <0.1 KU/L
A FUMIGATUS IGE QN: <0.1 KU/L
C HERBARUM IGE QN: <0.1 KU/L
CAT DANDER IGE QN: <0.1 KU/L
COMMON RAGWEED IGE QN: <0.1 KU/L
D FARINAE IGE QN: 4.22 KU/L
RED OAK IGE QN: <0.1 KU/L
RED TOP GRASS IGE QN: <0.1 KU/L